# Patient Record
Sex: MALE | Race: BLACK OR AFRICAN AMERICAN | Employment: FULL TIME | URBAN - METROPOLITAN AREA
[De-identification: names, ages, dates, MRNs, and addresses within clinical notes are randomized per-mention and may not be internally consistent; named-entity substitution may affect disease eponyms.]

---

## 2017-01-06 ENCOUNTER — OFFICE VISIT (OUTPATIENT)
Dept: INTERNAL MEDICINE CLINIC | Age: 37
End: 2017-01-06

## 2017-01-06 VITALS
TEMPERATURE: 99.1 F | OXYGEN SATURATION: 98 % | SYSTOLIC BLOOD PRESSURE: 120 MMHG | RESPIRATION RATE: 14 BRPM | BODY MASS INDEX: 30.94 KG/M2 | DIASTOLIC BLOOD PRESSURE: 80 MMHG | HEART RATE: 81 BPM | HEIGHT: 71 IN | WEIGHT: 221 LBS

## 2017-01-06 DIAGNOSIS — F17.200 TOBACCO DEPENDENCE: ICD-10-CM

## 2017-01-06 DIAGNOSIS — J45.20 MILD INTERMITTENT ASTHMA IN ADULT WITHOUT COMPLICATION: Primary | ICD-10-CM

## 2017-01-06 PROBLEM — Z82.49 FAMILY HISTORY OF CARDIOVASCULAR DISEASE: Status: ACTIVE | Noted: 2017-01-06

## 2017-01-06 NOTE — PROGRESS NOTES
Reviewed record in preparation for visit and have obtained necessary documentation. Identified pt with two pt identifiers(name and ). Health Maintenance Due   Topic    Pneumococcal 19-64 Medium Risk (1 of 1 - PPSV23)    DTaP/Tdap/Td series (1 - Tdap)         No chief complaint on file. Wt Readings from Last 3 Encounters:   17 221 lb (100.2 kg)   10/14/15 212 lb (96.2 kg)   07/14/15 218 lb (98.9 kg)     Temp Readings from Last 3 Encounters:   17 99.1 °F (37.3 °C) (Oral)   10/14/15 98.8 °F (37.1 °C) (Oral)   07/14/15 98.2 °F (36.8 °C) (Oral)     BP Readings from Last 3 Encounters:   17 120/80   10/14/15 120/80   07/14/15 124/60     Pulse Readings from Last 3 Encounters:   17 81   10/14/15 76   07/14/15 62           Learning Assessment:  :     Learning Assessment 2014   PRIMARY LEARNER Patient   HIGHEST LEVEL OF EDUCATION - PRIMARY LEARNER  GRADUATED HIGH SCHOOL OR GED   BARRIERS PRIMARY LEARNER NONE   CO-LEARNER CAREGIVER No   PRIMARY LANGUAGE ENGLISH    NEED No   LEARNER PREFERENCE PRIMARY READING     DEMONSTRATION   LEARNING SPECIAL TOPICS no   ANSWERED BY patient   RELATIONSHIP SELF       Depression Screening:  :     PHQ 2 / 9, over the last two weeks 2017   Little interest or pleasure in doing things Not at all   Feeling down, depressed or hopeless Not at all   Total Score PHQ 2 0       Fall Risk Assessment:  :     No flowsheet data found. Abuse Screening:  :     Abuse Screening Questionnaire 2014   Do you ever feel afraid of your partner? N   Are you in a relationship with someone who physically or mentally threatens you? N   Is it safe for you to go home?  Y       Coordination of Care Questionnaire:  :     1) Have you been to an emergency room, urgent care clinic since your last visit? no   Hospitalized since your last visit? no             2) Have you seen or consulted any other health care providers outside of [address] [place] [address] since your last visit? no  (Include any pap smears or colon screenings in this section.)    3) Do you have an Advance Directive on file? no    4) Are you interested in receiving information on Advance Directives?  NO

## 2017-01-06 NOTE — PATIENT INSTRUCTIONS
Asthma Action Plan: After Your Visit  Your Care Instructions  An asthma action plan is based on peak flow and asthma symptoms. Sorting symptoms and peak flow into red, yellow, and green \"zones\" can help you know how bad your asthma is and what actions you should take. Work with your doctor to make your plan. An action plan may include:  · The peak flow readings and symptoms for each zone. · What medicines to take in each zone. · When to call a doctor. · A list of emergency contact numbers. · A list of your asthma triggers. Follow-up care is a key part of your treatment and safety. Be sure to make and go to all appointments, and call your doctor if you are having problems. It's also a good idea to know your test results and keep a list of the medicines you take. How can you care for yourself at home? · Take your daily medicines to help minimize long-term damage and avoid asthma attacks. · Check your peak flow every morning and evening. This is the best way to know how well your lungs are working. · Check your action plan to see what zone you are in.  ¨ If you are in the green zone, keep taking your daily asthma medicines as prescribed. ¨ If you are in the yellow zone, you may be having or will soon have an asthma attack. You may not have any symptoms, but your lungs are not working as well as they should. Take the medicines listed in your action plan. If you stay in the yellow zone, your doctor may need to increase the dose or add a medicine. ¨ If you are in the red zone, follow your action plan. If your symptoms or peak flow don't improve soon, you may need to go to the emergency room or be admitted to the hospital.  · Use an asthma diary. Write down your peak flow readings in the asthma diary. If you have an attack, write down what caused it (if you know), the symptoms, and what medicine you took.   · Make sure you know how and when to call your doctor or go to the hospital.  · Take both the asthma action plan and the asthma diaryalong with your peak flow meter and medicineswhen you see your doctor. Tell your doctor if you are having trouble following your action plan. When should you call for help? Call 911 anytime you think you may need emergency care. For example, call if:  · You have severe trouble breathing. Call your doctor now or seek immediate medical care if:  · Your symptoms do not get better after you have followed your asthma action plan. · You cough up yellow, dark brown, or bloody mucus (sputum). Watch closely for changes in your health, and be sure to contact your doctor if:  · Your coughing and wheezing get worse. · You need to use quick-relief medicine on more than 2 days a week (unless it is just for exercise). · You need help figuring out what is triggering your asthma attacks. Where can you learn more? Go to [employer].be  Enter B511 in the search box to learn more about \"Asthma Action Plan: After Your Visit. \"   © 3016-7691 Healthwise, Incorporated. Care instructions adapted under license by Woody Cameron (which disclaims liability or warranty for this information). This care instruction is for use with your licensed healthcare professional. If you have questions about a medical condition or this instruction, always ask your healthcare professional. [place] [address] any warranty or liability for your use of this information. Content Version: 28.0.577202;  Last Revised: March 9, 2012

## 2017-01-06 NOTE — PROGRESS NOTES
HPI:  Zachary Rodriguez is a 39y.o. year old male who returns to clinic today for routine follow up appointment to discuss the issues below:    Here for follow up of asthma and tobacco dependence and med refill. He uses Dulera only prn with albuterol as a rescue inhaler typically for season / weather changes only. He feels generally well. Attends physical therapy for his achilles which he injured in July. Was running prior to this and felt well. Denies coughing or chest tightness. Wheezing is rare. Tried Chantix in Dec at wife's urging, took for 2 mo and tried to wean cigarette use down, was unsuccessful. Reports he doesn't have the motivation to quit currently and is back to 1/2 ppd. Prior to Admission medications    Medication Sig Start Date End Date Taking? Authorizing Provider   albuterol (PROAIR HFA) 90 mcg/actuation inhaler Take 1 Puff by inhalation every four (4) hours as needed for Wheezing or Shortness of Breath. 10/18/16  Yes Saima Vizcaino MD   mometasone-formoterol [facility]) 100-5 mcg/actuation HFA inhaler Take 1 Puff by inhalation two (2) times a day. 10/18/16  Yes Saima Vizcaino MD   fluticasone [facility]) 50 mcg/actuation nasal spray 2 Sprays by Both Nostrils route daily. 7/14/15  Yes Sonia Botello MD   Cetirizine (ZYRTEC) 10 mg cap Take  by mouth. Yes Historical Provider   omeprazole (PRILOSEC) 20 mg capsule Take 1 Cap by mouth two (2) times a day. 10/14/15   Sonia Botello MD          No Known Allergies        Review of Systems   Constitutional: Negative for chills, fever and malaise/fatigue. HENT: Negative for congestion. Respiratory: Negative for cough, shortness of breath and wheezing. Cardiovascular: Negative for chest pain, palpitations and leg swelling. Gastrointestinal: Negative for abdominal pain, blood in stool and heartburn. Musculoskeletal: Negative for falls, joint pain and myalgias. Neurological: Negative for dizziness and headaches.          Physical Exam   Constitutional: He appears well-nourished. Neck: Carotid bruit is not present. Cardiovascular: Normal rate, regular rhythm and normal heart sounds. No murmur heard. Pulses:       Carotid pulses are 2+ on the right side, and 2+ on the left side. Pulmonary/Chest: Effort normal and breath sounds normal.   Abdominal: Soft. Bowel sounds are normal. There is no hepatosplenomegaly. There is no tenderness. Musculoskeletal: He exhibits no edema. Visit Vitals    /80 (BP 1 Location: Left arm, BP Patient Position: Sitting)    Pulse 81    Temp 99.1 °F (37.3 °C) (Oral)    Resp 14    Ht 5' 10.5\" (1.791 m)    Wt 221 lb (100.2 kg)    SpO2 98%    BMI 31.26 kg/m2         Assessment & Plan:  Diagnoses and all orders for this visit:    Mild intermittent asthma in adult without complication  Tobacco dependence  Continue prn use of inhaled corticosteroid as he is overall doing quite well. Counseled about smoking cessation and increased risk for cardiovascular disease/stroke. he opted not to get the flu vaccine today   Also declines Tdap and Pneumovax. Follow-up Disposition:  Return in about 1 year (around 1/6/2018) for Annual Physicla or sooner as needed. Advised him to call back or return to office if symptoms worsen/change/persist.  Discussed expected course/resolution/complications of diagnosis in detail with patient. Medication risks/benefits/costs/interactions/alternatives discussed with patient. He was given an after visit summary which includes diagnoses, current medications, & vitals. He expressed understanding with the diagnosis and plan.

## 2017-01-06 NOTE — MR AVS SNAPSHOT
Visit Information Date & Time Provider Department Dept. Phone Encounter #  
 1/6/2017  8:40 AM Gavin Barragan MD [place] [id number] [facility] [phone] [id number] Follow-up Instructions Return in about 1 year (around 1/6/2018) for Annual Physicla or sooner as needed. Upcoming Health Maintenance Date Due DTaP/Tdap/Td series (2 - Td) 1/6/2027 Allergies as of 1/6/2017  Review Complete On: 1/6/2017 By: Gavin Barragan MD  
 No Known Allergies Current Immunizations  Reviewed on 8/5/2014 No immunizations on file. Not reviewed this visit You Were Diagnosed With   
  
 Codes Comments Mild intermittent asthma in adult without complication    -  Primary ICD-10-CM: J45.20 ICD-9-CM: 493.90 Tobacco dependence     ICD-10-CM: P01.058 ICD-9-CM: 305.1 Vitals BP Pulse Temp Resp Height(growth percentile) Weight(growth percentile) 120/80 (BP 1 Location: Left arm, BP Patient Position: Sitting) 81 99.1 °F (37.3 °C) (Oral) 14 5' 10.5\" (1.791 m) 221 lb (100.2 kg) SpO2 BMI Smoking Status 98% 31.26 kg/m2 Current Every Day Smoker Vitals History BMI and BSA Data Body Mass Index Body Surface Area  
 31.26 kg/m 2 2.23 m 2 Preferred Pharmacy Pharmacy Name Phone [facility] [place], [address] [place] AT [facility] [phone] Your Updated Medication List  
  
   
This list is accurate as of: 1/6/17  9:20 AM.  Always use your most recent med list.  
  
  
  
  
 albuterol 90 mcg/actuation inhaler Commonly known as:  PROAIR HFA Take 1 Puff by inhalation every four (4) hours as needed for Wheezing or Shortness of Breath. fluticasone 50 mcg/actuation nasal spray Commonly known as:  Ama Fryer 2 Sprays by Both Nostrils route daily. mometasone-formoterol 100-5 mcg/actuation HFA inhaler Commonly known as:  Amarilis Demi Take 1 Puff by inhalation two (2) times a day. ZyrTEC 10 mg Cap Generic drug:  Cetirizine Take  by mouth. Follow-up Instructions Return in about 1 year (around 1/6/2018) for Annual Physicla or sooner as needed. Patient Instructions Asthma Action Plan: After Your Visit Your Care Instructions An asthma action plan is based on peak flow and asthma symptoms. Sorting symptoms and peak flow into red, yellow, and green \"zones\" can help you know how bad your asthma is and what actions you should take. Work with your doctor to make your plan. An action plan may include: · The peak flow readings and symptoms for each zone. · What medicines to take in each zone. · When to call a doctor. · A list of emergency contact numbers. · A list of your asthma triggers. Follow-up care is a key part of your treatment and safety. Be sure to make and go to all appointments, and call your doctor if you are having problems. It's also a good idea to know your test results and keep a list of the medicines you take. How can you care for yourself at home? · Take your daily medicines to help minimize long-term damage and avoid asthma attacks. · Check your peak flow every morning and evening. This is the best way to know how well your lungs are working. · Check your action plan to see what zone you are in. 
¨ If you are in the green zone, keep taking your daily asthma medicines as prescribed. ¨ If you are in the yellow zone, you may be having or will soon have an asthma attack. You may not have any symptoms, but your lungs are not working as well as they should. Take the medicines listed in your action plan. If you stay in the yellow zone, your doctor may need to increase the dose or add a medicine. ¨ If you are in the red zone, follow your action plan.  If your symptoms or peak flow don't improve soon, you may need to go to the emergency room or be admitted to the hospital. 
 · Use an asthma diary. Write down your peak flow readings in the asthma diary. If you have an attack, write down what caused it (if you know), the symptoms, and what medicine you took. · Make sure you know how and when to call your doctor or go to the hospital. 
· Take both the asthma action plan and the asthma diaryalong with your peak flow meter and medicineswhen you see your doctor. Tell your doctor if you are having trouble following your action plan. When should you call for help? Call 911 anytime you think you may need emergency care. For example, call if: 
· You have severe trouble breathing. Call your doctor now or seek immediate medical care if: 
· Your symptoms do not get better after you have followed your asthma action plan. · You cough up yellow, dark brown, or bloody mucus (sputum). Watch closely for changes in your health, and be sure to contact your doctor if: 
· Your coughing and wheezing get worse. · You need to use quick-relief medicine on more than 2 days a week (unless it is just for exercise). · You need help figuring out what is triggering your asthma attacks. Where can you learn more? Go to [employer].be Enter 549 [id number] in the search box to learn more about \"Asthma Action Plan: After Your Visit. \"  
© 8694-0199 Healthwise, Incorporated. Care instructions adapted under license by Benitez Felton (which disclaims liability or warranty for this information). This care instruction is for use with your licensed healthcare professional. If you have questions about a medical condition or this instruction, always ask your healthcare professional. [place] [address] any warranty or liability for your use of this information. Content Version: 00.4.054579; Last Revised: March 9, 2012 Introducing [facility]!    
 Benite[employer] Felton introduces [employer] patient portal. Now you can access parts of your medical record, email your doctor's office, and request medication refills online. 1. In your internet browser, go to [email or web address] 2. Click on the First Time User? Click Here link in the Sign In box. You will see the New Member Sign Up page. 3. Enter your [facility] Access Code exactly as it appears below. You will not need to use this code after youve completed the sign-up process. If you do not sign up before the expiration date, you must request a new code. · [facility] Access Code: [email or web address] Expires: 4/6/2017  9:18 AM 
 
4. Enter the last four digits of your Social Security Number (xxxx) and Date of Birth (mm/dd/yyyy) as indicated and click Submit. You will be taken to the next sign-up page. 5. Create a [facility] ID. This will be your [facility] login ID and cannot be changed, so think of one that is secure and easy to remember. 6. Create a [facility] password. You can change your password at any time. 7. Enter your Password Reset Question and Answer. This can be used at a later time if you forget your password. 8. Enter your e-mail address. You will receive e-mail notification when new information is available in [address] 9. Click Sign Up. You can now view and download portions of your medical record. 10. Click the Download Summary menu link to download a portable copy of your medical information. If you have questions, please visit the Frequently Asked Questions section of the [facility] website. Remember, [facility] is NOT to be used for urgent needs. For medical emergencies, dial 911. Now available from your iPhone and Android! Please provide this summary of care documentation to your next provider. Your primary care clinician is listed as [address]. If you have any questions after today's visit, please call [phone].

## 2017-09-29 ENCOUNTER — OFFICE VISIT (OUTPATIENT)
Dept: INTERNAL MEDICINE CLINIC | Age: 37
End: 2017-09-29

## 2017-09-29 VITALS
RESPIRATION RATE: 16 BRPM | HEIGHT: 70 IN | BODY MASS INDEX: 30.64 KG/M2 | SYSTOLIC BLOOD PRESSURE: 130 MMHG | TEMPERATURE: 98.3 F | DIASTOLIC BLOOD PRESSURE: 96 MMHG | OXYGEN SATURATION: 98 % | HEART RATE: 83 BPM | WEIGHT: 214 LBS

## 2017-09-29 DIAGNOSIS — J30.89 PERENNIAL ALLERGIC RHINITIS: ICD-10-CM

## 2017-09-29 DIAGNOSIS — F17.200 TOBACCO DEPENDENCE: ICD-10-CM

## 2017-09-29 DIAGNOSIS — Z86.39 H/O THYROID DISEASE: ICD-10-CM

## 2017-09-29 DIAGNOSIS — J45.30 MILD PERSISTENT ASTHMA WITHOUT COMPLICATION: ICD-10-CM

## 2017-09-29 DIAGNOSIS — Z82.49 FAMILY HISTORY OF CARDIOVASCULAR DISEASE: Primary | ICD-10-CM

## 2017-09-29 RX ORDER — ALBUTEROL SULFATE 90 UG/1
1 AEROSOL, METERED RESPIRATORY (INHALATION)
Qty: 1 INHALER | Refills: 3 | Status: SHIPPED | OUTPATIENT
Start: 2017-09-29 | End: 2019-04-10 | Stop reason: SDUPTHER

## 2017-09-29 RX ORDER — FLUTICASONE PROPIONATE 50 MCG
2 SPRAY, SUSPENSION (ML) NASAL DAILY
Qty: 1 BOTTLE | Refills: 3 | Status: SHIPPED | OUTPATIENT
Start: 2017-09-29 | End: 2020-04-13

## 2017-09-29 NOTE — PROGRESS NOTES
Subjective:      Ying Herrera is a 44 y.o. male who presents today to establish care      Asthma/Environmental Allergies:  [facility]- only taking prn? Not taking daily, was unaware  Albuterol inhaler prn  Asthma usually worse depending on seasons (worse in the fall) or after exercise  Takes zyrtec daily, used to do flonase    Tobacco Dependence:  1 pack/week  About his average  Tried chantix in the past- didn't work, \"didnt like how it made me feel, couldn't sleep for nothing\" x 3-4 days and then stopped    H/o Thyroid Disease:  ? Graves Disease? 10 years ago? Was given medication- took for 1 day and then never again  Juliaetta a flare up every now and then\", throat swells and hurts, gets fatigued easily  Lasts about a week and then resolves with rest    No recent unintentional wieght loss or gain, chest pain, palpitations, diarrhea/constipation/abdominal pain, blood in stool, difficulty urinating, HAs, dizziness    BP elevated above baseline today. Pt reporting that he has drank a lot of coffee today. Per chart review, always runs 120/80    Exercise: boxes, rides bike, works out frequently    Patient Active Problem List    Diagnosis Date Noted    Family history of cardiovascular disease 01/06/2017    Perennial allergic rhinitis 08/05/2014    H/O thyroid disease 08/05/2014    Tobacco dependence 08/05/2014     Current Outpatient Prescriptions   Medication Sig Dispense Refill    albuterol (PROAIR HFA) 90 mcg/actuation inhaler Take 1 Puff by inhalation every four (4) hours as needed for Wheezing or Shortness of Breath. 1 Inhaler 3    mometasone-formoterol (DULERA) 100-5 mcg/actuation HFA inhaler Take 1 Puff by inhalation two (2) times a day. 1 Inhaler 3    Cetirizine (ZYRTEC) 10 mg cap Take  by mouth.  fluticasone (FLONASE) 50 mcg/actuation nasal spray 2 Sprays by Both Nostrils route daily. 1 Bottle 3        Review of Systems    Pertinent items are noted in HPI.      Objective:     Visit Vitals    BP (!) 130/96 (BP 1 Location: Left arm, BP Patient Position: Sitting)    Pulse 83    Temp 98.3 °F (36.8 °C) (Oral)    Resp 16    Ht 5' 10\" (1.778 m)    Wt 214 lb (97.1 kg)    SpO2 98%    BMI 30.71 kg/m2     General appearance: alert, cooperative, no distress, appears stated age  Neck: supple, symmetrical, trachea midline, no adenopathy, thyroid: not enlarged, symmetric, no tenderness/mass/nodules, no carotid bruit and no JVD  Lungs: clear to auscultation bilaterally  Heart: regular rate and rhythm, S1, S2 normal, no murmur, click, rub or gallop  Extremities: extremities normal, atraumatic, steady gait  Skin: Skin color, texture, turgor normal. No rashes or lesions  Neurologic: Grossly normal    Assessment/Plan:     1. Mild persistent asthma without complication  - educated re correct inhaler use  - needs to use Dulera inhaler consistently every day  - albuterol prn  - keep good control on allergies  - stop smoking, pt not ready to stop    2. Perennial allergic rhinitis  - cont zyrtec  - fluticasone (FLONASE) 50 mcg/actuation nasal spray; 2 Sprays by Both Nostrils route daily. Dispense: 1 Bottle; Refill: 3    3. Tobacco dependence  - pt not ready to quit  - LIPID PANEL    4. Family history of cardiovascular disease  - tobacco cessation- pt not ready to quite  - will need to closely monitor BP, if continues to be borderline, may consider starting medication  - lipi panel  - CBC WITH AUTOMATED DIFF  - METABOLIC PANEL, COMPREHENSIVE  - LIPID PANEL    5. H/O thyroid disease  - no current meds, labs normal on previous checks that I can see  - TSH RFX ON ABNORMAL TO FREE T4      Advised him to call back or return to office if symptoms worsen/change/persist.  Discussed expected course/resolution/complications of diagnosis in detail with patient. Medication risks/benefits/costs/interactions/alternatives discussed with patient.   He was given an after visit summary which includes diagnoses, current medications, & vitals. He expressed understanding with the diagnosis and plan.     MICHAELA Wallace

## 2017-09-29 NOTE — MR AVS SNAPSHOT
Visit Information Date & Time Provider Department Dept. Phone Encounter #  
 9/29/2017  4:00 PM [facility] [phone] [id number] Upcoming Health Maintenance Date Due INFLUENZA AGE 9 TO ADULT 8/1/2017 DTaP/Tdap/Td series (2 - Td) 1/6/2027 Allergies as of 9/29/2017  Review Complete On: 9/29/2017 By: Rudy Rowe LPN No Known Allergies Current Immunizations  Reviewed on 8/5/2014 No immunizations on file. Not reviewed this visit You Were Diagnosed With   
  
 Codes Comments Family history of cardiovascular disease    -  Primary ICD-10-CM: Z82.49 
ICD-9-CM: V17.49 Perennial allergic rhinitis     ICD-10-CM: J30.89 ICD-9-CM: 477.8 Tobacco dependence     ICD-10-CM: F14.575 ICD-9-CM: 305.1 H/O thyroid disease     ICD-10-CM: Z86.39 
ICD-9-CM: V12.29 Vitals BP Pulse Temp Resp Height(growth percentile) Weight(growth percentile) (!) 130/96 (BP 1 Location: Left arm, BP Patient Position: Sitting) 83 98.3 °F (36.8 °C) (Oral) 16 5' 10\" (1.778 m) 214 lb (97.1 kg) SpO2 BMI Smoking Status 98% 30.71 kg/m2 Current Every Day Smoker Vitals History BMI and BSA Data Body Mass Index Body Surface Area 30.71 kg/m 2 2.19 m 2 Preferred Pharmacy Pharmacy Name Phone [facility] [place], [address] [place] AT [facility] [phone] Your Updated Medication List  
  
   
This list is accurate as of: 9/29/17  4:15 PM.  Always use your most recent med list.  
  
  
  
  
 albuterol 90 mcg/actuation inhaler Commonly known as:  PROAIR HFA Take 1 Puff by inhalation every four (4) hours as needed for Wheezing or Shortness of Breath. fluticasone 50 mcg/actuation nasal spray Commonly known as:  Juju Deborah 2 Sprays by Both Nostrils route daily. mometasone-formoterol 100-5 mcg/actuation HFA inhaler Commonly known as:  Christopher  Take 1 Puff by inhalation two (2) times a day. ZyrTEC 10 mg Cap Generic drug:  Cetirizine Take  by mouth. Prescriptions Sent to Pharmacy Refills  
 albuterol (PROAIR HFA) 90 mcg/actuation inhaler 3 Sig: Take 1 Puff by inhalation every four (4) hours as needed for Wheezing or Shortness of Breath. Class: Normal  
 Pharmacy: [address] Ph #: [phone] Route: Inhalation  
 mometasone-formoterol (DULERA) 100-5 mcg/actuation HFA inhaler 3 Sig: Take 1 Puff by inhalation two (2) times a day. Class: Normal  
 Pharmacy: [address] Ph #: [phone] Route: Inhalation  
 fluticasone (FLONASE) 50 mcg/actuation nasal spray 3 Si Sprays by Both Nostrils route daily. Class: Normal  
 Pharmacy: [address] Ph #: [phone] Route: Both Nostrils We Performed the Following CBC WITH AUTOMATED DIFF [58103 CPT(R)] LIPID PANEL [76070 CPT(R)] METABOLIC PANEL, COMPREHENSIVE [48486 CPT(R)] TSH RFX ON ABNORMAL TO FREE T4 [[id number] Custom] Patient Instructions Lab Joellen: Close to [address], [place] · [phone] Introducing [facility]! Re Clifford introduces [employer] patient portal. Now you can access parts of your medical record, email your doctor's office, and request medication refills online. 1. In your internet browser, go to [email or web address] 2. Click on the First Time User? Click Here link in the Sign In box. You will see the New Member Sign Up page. 3. Enter your [employer] Access Code exactly as it appears below. You will not need to use this code after youve completed the sign-up process.  If you do not sign up before the expiration date, you must request a new code. · [employer] Access Code: [email or web address] Expires: 12/28/2017  4:15 PM 
 
4. Enter the last four digits of your Social Security Number (xxxx) and Date of Birth (mm/dd/yyyy) as indicated and click Submit. You will be taken to the next sign-up page. 5. Create a [employer] ID. This will be your [employer] login ID and cannot be changed, so think of one that is secure and easy to remember. 6. Create a [employer] password. You can change your password at any time. 7. Enter your Password Reset Question and Answer. This can be used at a later time if you forget your password. 8. Enter your e-mail address. You will receive e-mail notification when new information is available in [address] 9. Click Sign Up. You can now view and download portions of your medical record. 10. Click the Download Summary menu link to download a portable copy of your medical information. If you have questions, please visit the Frequently Asked Questions section of the [employer] website. Remember, [employer] is NOT to be used for urgent needs. For medical emergencies, dial 911. Now available from your iPhone and Android! Please provide this summary of care documentation to your next provider. Your primary care clinician is listed as Mario Giron. If you have any questions after today's visit, please call [phone].

## 2017-09-29 NOTE — PROGRESS NOTES
Reviewed record in preparation for visit and have obtained necessary documentation. Identified pt with two pt identifiers(name and ). Health Maintenance Due   Topic    INFLUENZA AGE 9 TO ADULT          Chief Complaint   Patient presents with   [facility]     previous MPL pt. Wt Readings from Last 3 Encounters:   17 214 lb (97.1 kg)   17 221 lb (100.2 kg)   10/14/15 212 lb (96.2 kg)     Temp Readings from Last 3 Encounters:   17 98.3 °F (36.8 °C) (Oral)   17 99.1 °F (37.3 °C) (Oral)   10/14/15 98.8 °F (37.1 °C) (Oral)     BP Readings from Last 3 Encounters:   17 (!) 130/96   17 120/80   10/14/15 120/80     Pulse Readings from Last 3 Encounters:   17 83   17 81   10/14/15 76           Learning Assessment:  :     Learning Assessment 2017   PRIMARY LEARNER Patient Patient   HIGHEST LEVEL OF EDUCATION - PRIMARY LEARNER  GRADUATED HIGH SCHOOL OR GED GRADUATED HIGH SCHOOL OR GED   BARRIERS PRIMARY LEARNER NONE NONE   CO-LEARNER CAREGIVER No No   PRIMARY LANGUAGE ENGLISH ENGLISH    NEED - No   LEARNER PREFERENCE PRIMARY DEMONSTRATION READING     READING DEMONSTRATION   LEARNING SPECIAL TOPICS - no   ANSWERED BY self patient   RELATIONSHIP SELF SELF       Depression Screening:  :     PHQ over the last two weeks 2017   Little interest or pleasure in doing things Not at all   Feeling down, depressed or hopeless Not at all   Total Score PHQ 2 0       Fall Risk Assessment:  :     No flowsheet data found. Abuse Screening:  :     Abuse Screening Questionnaire 2017   Do you ever feel afraid of your partner? N N   Are you in a relationship with someone who physically or mentally threatens you? N N   Is it safe for you to go home?  Y Y       Coordination of Care Questionnaire:  :     1) Have you been to an emergency room, urgent care clinic since your last visit? no   Hospitalized since your last visit? no 2) Have you seen or consulted any other health care providers outside of [address] [place] [address] since your last visit? no  (Include any pap smears or colon screenings in this section.)    3) Do you have an Advance Directive on file? no    4) Are you interested in receiving information on Advance Directives?  NO

## 2017-10-14 LAB
ALBUMIN SERPL-MCNC: 4.9 G/DL (ref 3.5–5.5)
ALBUMIN/GLOB SERPL: 1.6 {RATIO} (ref 1.2–2.2)
ALP SERPL-CCNC: 136 IU/L (ref 39–117)
ALT SERPL-CCNC: 68 IU/L (ref 0–44)
AST SERPL-CCNC: 48 IU/L (ref 0–40)
BASOPHILS # BLD AUTO: 0 X10E3/UL (ref 0–0.2)
BASOPHILS NFR BLD AUTO: 0 %
BILIRUB SERPL-MCNC: 0.5 MG/DL (ref 0–1.2)
BUN SERPL-MCNC: 11 MG/DL (ref 6–20)
BUN/CREAT SERPL: 11 (ref 9–20)
CALCIUM SERPL-MCNC: 10.1 MG/DL (ref 8.7–10.2)
CHLORIDE SERPL-SCNC: 102 MMOL/L (ref 96–106)
CHOLEST SERPL-MCNC: 170 MG/DL (ref 100–199)
CO2 SERPL-SCNC: 26 MMOL/L (ref 18–29)
CREAT SERPL-MCNC: 0.97 MG/DL (ref 0.76–1.27)
EOSINOPHIL # BLD AUTO: 0.2 X10E3/UL (ref 0–0.4)
EOSINOPHIL NFR BLD AUTO: 4 %
ERYTHROCYTE [DISTWIDTH] IN BLOOD BY AUTOMATED COUNT: 14.2 % (ref 12.3–15.4)
GLOBULIN SER CALC-MCNC: 3 G/DL (ref 1.5–4.5)
GLUCOSE SERPL-MCNC: 98 MG/DL (ref 65–99)
HCT VFR BLD AUTO: 44 % (ref 37.5–51)
HDLC SERPL-MCNC: 64 MG/DL
HGB BLD-MCNC: 14.7 G/DL (ref 12.6–17.7)
IMM GRANULOCYTES # BLD: 0 X10E3/UL (ref 0–0.1)
IMM GRANULOCYTES NFR BLD: 0 %
INTERPRETATION, 910389: NORMAL
LDLC SERPL CALC-MCNC: 93 MG/DL (ref 0–99)
LYMPHOCYTES # BLD AUTO: 1.5 X10E3/UL (ref 0.7–3.1)
LYMPHOCYTES NFR BLD AUTO: 41 %
MCH RBC QN AUTO: 28.4 PG (ref 26.6–33)
MCHC RBC AUTO-ENTMCNC: 33.4 G/DL (ref 31.5–35.7)
MCV RBC AUTO: 85 FL (ref 79–97)
MONOCYTES # BLD AUTO: 0.4 X10E3/UL (ref 0.1–0.9)
MONOCYTES NFR BLD AUTO: 10 %
NEUTROPHILS # BLD AUTO: 1.7 X10E3/UL (ref 1.4–7)
NEUTROPHILS NFR BLD AUTO: 45 %
PLATELET # BLD AUTO: 182 X10E3/UL (ref 150–379)
POTASSIUM SERPL-SCNC: 4.7 MMOL/L (ref 3.5–5.2)
PROT SERPL-MCNC: 7.9 G/DL (ref 6–8.5)
RBC # BLD AUTO: 5.18 X10E6/UL (ref 4.14–5.8)
SODIUM SERPL-SCNC: 142 MMOL/L (ref 134–144)
T4 FREE SERPL-MCNC: 1.27 NG/DL (ref 0.82–1.77)
TRIGL SERPL-MCNC: 63 MG/DL (ref 0–149)
TSH SERPL DL<=0.005 MIU/L-ACNC: 0.01 UIU/ML (ref 0.45–4.5)
VLDLC SERPL CALC-MCNC: 13 MG/DL (ref 5–40)
WBC # BLD AUTO: 3.7 X10E3/UL (ref 3.4–10.8)

## 2017-10-16 ENCOUNTER — TELEPHONE (OUTPATIENT)
Dept: INTERNAL MEDICINE CLINIC | Age: 37
End: 2017-10-16

## 2017-10-16 DIAGNOSIS — E05.90 HYPERTHYROIDISM: Primary | ICD-10-CM

## 2017-10-16 DIAGNOSIS — R79.89 ELEVATED LFTS: ICD-10-CM

## 2017-10-16 NOTE — PROGRESS NOTES
Spoke with patient regarding abnormal thyroid function. Does have h.o untreated Graves disease in the past per patient report. Is open now to having treatment. Pt also with abnormal LFTs. Denies poor diet, etoh abuse, IV drug use. Will recheck labs for this. If continue to be elevated, will do ultrasound to evaluate further    Will send order for T3 and LFTs to the patient and patient will have done through [employer].    Gave patient name and phone number for Dr. Preet Funes, Endorcinology, referral placed in system. Patient instructed to call and schedule an appointment, if Dr. Rylan Shukla not able to see- okay to see any provider in the practice. Patient verbalized understanding of results and plan.     Renetta Naylor NP

## 2017-10-27 ENCOUNTER — PATIENT MESSAGE (OUTPATIENT)
Dept: INTERNAL MEDICINE CLINIC | Age: 37
End: 2017-10-27

## 2017-10-27 NOTE — TELEPHONE ENCOUNTER
From: Mirella Coffman Sent: 10/27/2017 10:21 AM EDT  Subject: Visit Follow-Up Question    Dr. Curiel Even can't see me until Dec 19. Is there another doctor you can refer that can get me in sooner? Thank you!

## 2017-11-02 NOTE — TELEPHONE ENCOUNTER
Patient email: I received a letter from [facility] that we need to switch the prescription for the [facility] to a [address]. Can you please call in a new prescription to the location at [facility] 96 office visit- 9/29/17  Next office visit- No Upcoming   Last Refill- 9/291/17    Requested Prescriptions     Pending Prescriptions Disp Refills    mometasone-formoterol (DULERA) 100-5 mcg/actuation HFA inhaler 1 Inhaler 3     Sig: Take 1 Puff by inhalation two (2) times a day.

## 2017-11-02 NOTE — TELEPHONE ENCOUNTER
Re-sending request to [facility] pharmacy     Last office visit- 9/29/17  Next office visit- No upcoming   Last Refill- 9/29/17      Requested Prescriptions     Pending Prescriptions Disp Refills    mometasone-formoterol (DULERA) 100-5 mcg/actuation HFA inhaler 1 Inhaler 3     Sig: Take 1 Puff by inhalation two (2) times a day.

## 2017-11-03 NOTE — TELEPHONE ENCOUNTER
Rejection message from [facility]:  Pharmacy is requesting 90 day supply of this medication. Last office visit- 9/29/17  Next office visit- 12/19/17  Last Refill- 11/3/17    Requested Prescriptions     Pending Prescriptions Disp Refills    mometasone-formoterol (DULERA) 100-5 mcg/actuation HFA inhaler 1 Inhaler 3     Sig: Take 1 Puff by inhalation two (2) times a day.

## 2017-11-07 NOTE — TELEPHONE ENCOUNTER
Received fax requesting 90 day supply of Dulera inhaler. This has been sent for #1 inhaler x3 refills multiple times. Call to pharmacy to clarify how many inhalers are considered 90 day supply, noted that #13? Lisseth Parikh Spoke with pharmacist- clarified this is 13 mg for each inhaler. Verbal order given to pharmacist to change rx to [facility] #3 inhalers with x1 refill. Meghan Rodas NP consulted and agrees. Rx called in successfully.

## 2017-11-10 LAB
ALBUMIN SERPL-MCNC: 4.9 G/DL (ref 3.5–5.5)
ALP SERPL-CCNC: 124 IU/L (ref 39–117)
ALT SERPL-CCNC: 37 IU/L (ref 0–44)
AST SERPL-CCNC: 39 IU/L (ref 0–40)
BILIRUB DIRECT SERPL-MCNC: 0.14 MG/DL (ref 0–0.4)
BILIRUB SERPL-MCNC: 0.4 MG/DL (ref 0–1.2)
PROT SERPL-MCNC: 8.1 G/DL (ref 6–8.5)
T3 SERPL-MCNC: 126 NG/DL (ref 71–180)

## 2017-12-19 ENCOUNTER — OFFICE VISIT (OUTPATIENT)
Dept: ENDOCRINOLOGY | Age: 37
End: 2017-12-19

## 2017-12-19 VITALS
HEIGHT: 70 IN | WEIGHT: 226 LBS | RESPIRATION RATE: 16 BRPM | DIASTOLIC BLOOD PRESSURE: 81 MMHG | SYSTOLIC BLOOD PRESSURE: 133 MMHG | BODY MASS INDEX: 32.35 KG/M2 | HEART RATE: 78 BPM

## 2017-12-19 DIAGNOSIS — Z86.39 H/O THYROID DISEASE: Primary | ICD-10-CM

## 2017-12-19 DIAGNOSIS — R53.83 LOW ENERGY: ICD-10-CM

## 2017-12-19 NOTE — PROGRESS NOTES
Lab Results   Component Value Date/Time    TSH 0.011 10/13/2017 08:55 AM    TSH 1.610 07/14/2015 03:06 PM    T4, Free 1.27 07/14/2015 03:06 PM

## 2017-12-19 NOTE — MR AVS SNAPSHOT
Visit Information Date & Time Provider Department Dept. Phone Encounter #  
 12/19/2017  9:50 AM Maylin Posada MD [facility]  Upcoming Health Maintenance Date Due DTaP/Tdap/Td series (2 - Td) 1/6/2027 Allergies as of 12/19/2017  Review Complete On: 12/19/2017 By: Megan Garcia No Known Allergies Current Immunizations  Reviewed on 8/5/2014 No immunizations on file. Not reviewed this visit You Were Diagnosed With   
  
 Codes Comments H/O thyroid disease    -  Primary ICD-10-CM: Z86.39 
ICD-9-CM: V12.29 Low energy     ICD-10-CM: R53.83 ICD-9-CM: 780.79 Vitals BP Pulse Resp Height(growth percentile) Weight(growth percentile) BMI  
 133/81 78 16 5' 10\" (1.778 m) 226 lb (102.5 kg) 32.43 kg/m2 Smoking Status Current Every Day Smoker Vitals History BMI and BSA Data Body Mass Index Body Surface Area  
 32.43 kg/m 2 2.25 m 2 Preferred Pharmacy Pharmacy Name Phone [id number] [place] [phone] Your Updated Medication List  
  
   
This list is accurate as of: 12/19/17  9:50 AM.  Always use your most recent med list.  
  
  
  
  
 albuterol 90 mcg/actuation inhaler Commonly known as:  PROAIR HFA Take 1 Puff by inhalation every four (4) hours as needed for Wheezing or Shortness of Breath. fluticasone 50 mcg/actuation nasal spray Commonly known as:  Nirav Merles 2 Sprays by Both Nostrils route daily. mometasone-formoterol 100-5 mcg/actuation HFA inhaler Commonly known as:  Mardeen Fuse Take 1 Puff by inhalation two (2) times a day. ZyrTEC 10 mg Cap Generic drug:  Cetirizine Take  by mouth. We Performed the Following [facility] AND LH [70496 CPT(R)] PROLACTIN [39120 CPT(R)] T3, FREE [id number] CPT(R)] T3, FREE [id number] CPT(R)] TESTOSTERONE, FREE & TOTAL [78772 CPT(R)] THYROID ANTIBODY PANEL [38410 CPT(R)] TSH AND FREE T4 [06287 CPT(R)] TSH AND FREE T4 [85076 CPT(R)] TSH RECEPTOR AB [60141 CPT(R)] Introducing [facility]! Dear Ian Puentes: Thank you for requesting a [email or web address] account. Our records indicate that you already have an active [email or web address] account. You can access your account anytime at [email or web address]" Did you know that you can access your hospital and ER discharge instructions at any time in [email or web address]? You can also review all of your test results from your hospital stay or ER visit. Additional Information If you have questions, please visit the Frequently Asked Questions section of the [email or web address] website at [email or web address] Remember, [email or web address] is NOT to be used for urgent needs. For medical emergencies, dial 911. Now available from your iPhone and Android! Please provide this summary of care documentation to your next provider. Your primary care clinician is listed as [facility]. If you have any questions after today's visit, please call [phone].

## 2017-12-19 NOTE — PROGRESS NOTES
Endocrinology New Patient Visit    CC:  Hyperthyroidism    Referring provider: James Gardner NP     History of present illness:  Patient is a pleasant 44 y.o. male here for new patient consultation for h/o Graves disease. This was diagnosed 8 years ago when he lived in [place]. He does not remember the endocrinologist he saw but remembers he had a thyroid scan and was prescribed medications that he did not start. TFTs have been normal without thyroid medication for the past few years, however recent TSH returned low at 0.011 with normal FT4 on labs done by his PCP. Today he reports symptoms including a 20 lb weight gain, fatigue, neck tenderness and swelling, some hand tremors, heat intolerance and excessive perspiration. He denies palpitations but does have decrease in exercise tolerance. He denies gritty or bulging eyes or diplopia, admits visual acuity is less though. Reports low libido and premature ejaculation. No recent iodine exposures. He dypshagia, supine compression or voice hoarseness. He is a current smoker, trying to cut down but is not ready to quit yet - smokes 1/4 PPD. ROS: see HPI for pertinent positives and negatives, otherwise a 10 system review is negative    Problem list:  Patient Active Problem List   Diagnosis Code    Perennial allergic rhinitis J30.89    H/O thyroid disease Z86.39    Tobacco dependence F17.200    Family history of cardiovascular disease Z82.49       Medical history:  Past Medical History:   Diagnosis Date    Asthma     since childhood    H/O lithotripsy 2013    Hyperthyroidism 2011    Dx in [place]       Past surgical history:  History reviewed. No pertinent surgical history.     Medications:    Current Outpatient Prescriptions:     mometasone-formoterol (DULERA) 100-5 mcg/actuation HFA inhaler, Take 1 Puff by inhalation two (2) times a day., Disp: 3 Inhaler, Rfl: 1    albuterol (PROAIR HFA) 90 mcg/actuation inhaler, Take 1 Puff by inhalation every four (4) hours as needed for Wheezing or Shortness of Breath., Disp: 1 Inhaler, Rfl: 3    Cetirizine (ZYRTEC) 10 mg cap, Take  by mouth., Disp: , Rfl:     fluticasone (FLONASE) 50 mcg/actuation nasal spray, 2 Sprays by Both Nostrils route daily. , Disp: 1 Bottle, Rfl: 3    Allergies:  No Known Allergies    Social History:  Social History     Social History    Marital status:      Spouse name: N/A    Number of children: N/A    Years of education: N/A     Occupational History   [address] [place] [address] [email or web address]     Social History Main Topics    Smoking status: Current Every Day Smoker     Packs/day: 0.50     Years: 17.00     Types: Cigarettes    Smokeless tobacco: Never Used    Alcohol use 3.5 oz/week     7 Glasses of wine per week      Comment: occ    Drug use: No    Sexual activity: Yes     Partners: Female      Comment:      Other Topics Concern    Exercise Yes     daily jog in morning     Social History Narrative       Family History:  Family History   Problem Relation Age of Onset    Heart Disease Father      Heart failure 45s    Hypertension Father     Breast Cancer Maternal Grandmother     Hypertension Sister        Physical Exam:  Visit Vitals    Ht 5' 10\" (1.778 m)    Wt 226 lb (102.5 kg)    BMI 32.43 kg/m2     Gen: NAD  Heent: mucous membranes moist, no lid lag, stare or exophthalmos. No scleral or conjunctival injection. Extra ocular muscles intact . Thyroid: moves well with swallowing, normal size, rubbery texture no thyroid bruit, no masses appreciated  Heme/lymph: no cervical, supraclavicular or submandibular lymphadenopathy is appreciated. Pulmonary: clear to auscultation bilaterally, no wheezes/rhonchi or rales  Cardiovascular: regular rate and rhythm, no murmurs, rubs or gallops, good distal pulses  Extremities: no clubbing, cyanosis or edema. No onocholysis   Neuro: no tremor of outstretched hands, reflexes are normal with normal relaxation phase.  Normal gait, normal concentration  Skin: normal texture, warm and dry  Psyche: normal affect with good insight into his medical conditions    Pertinent lab review:    Component      Latest Ref Rng & Units 11/9/2017 10/13/2017 10/13/2017 7/14/2015           8:27 AM  8:55 AM  8:55 AM  3:06 PM   TSH      0.450 - 4.500 uIU/mL   0.011 (L)    T4, Free      0.82 - 1.77 ng/dL    1.27   T4,Free (Direct)~      0.82 - 1.77 ng/dL  1.27     T3, total      71 - 180 ng/dL 126        Component      Latest Ref Rng & Units 7/14/2015 8/5/2014 8/5/2014           3:06 PM  3:15 PM  3:15 PM   TSH      0.450 - 4.500 uIU/mL 1.610  0.886   T4, Free      0.82 - 1.77 ng/dL  1.32    T4,Free (Direct)~      0.82 - 1.77 ng/dL      T3, total      71 - 180 ng/dL        Assessment and plan:  Patient is a pleasant 44 y.o. male here for new patient consultation for h/o Graves disease and recently low TSH value. Clinical picture is suggestive of hypothyroidism rather than hyper, so will repeat TSH with FT3/FT4 and thyroid antibodies (TPO and TRAb) today. Discussed that he may have competing antibodies. If TSH remains low, will order an uptake/scan to clarify the activity of his gland. Based on the results, will determine which, if any, thyroid medication is appropriate. Check testosterone given c/o fatigue and low libido. Also check FSH, LH, and prolactin. I spent 30 minutes with the patient today and > 50% of the time was spent counseling the patient about hyperthyroidism: its diagnosis, clinical manifestations, and risks/benefits of the various management options. Thank you for the opportunity to partake in this patients care.   Jyotsna Sands MD  [facility]  [facility]

## 2017-12-20 LAB
T3FREE SERPL-MCNC: 3.3 PG/ML (ref 2–4.4)
T4 FREE SERPL-MCNC: 1.19 NG/DL (ref 0.82–1.77)
THYROGLOB AB SERPL-ACNC: 78.5 IU/ML (ref 0–0.9)
THYROPEROXIDASE AB SERPL-ACNC: 468 IU/ML (ref 0–34)
TSH RECEP AB SER-ACNC: 6.08 IU/L (ref 0–1.75)
TSH SERPL DL<=0.005 MIU/L-ACNC: 0.54 UIU/ML (ref 0.45–4.5)

## 2017-12-21 LAB
FSH SERPL-ACNC: 6.8 MIU/ML (ref 1.5–12.4)
LH SERPL-ACNC: 6 MIU/ML (ref 1.7–8.6)
PROLACTIN SERPL-MCNC: 3.5 NG/ML (ref 4–15.2)
TESTOST FREE SERPL-MCNC: 15.3 PG/ML (ref 8.7–25.1)
TESTOST SERPL-MCNC: 657 NG/DL (ref 264–916)

## 2018-02-05 NOTE — TELEPHONE ENCOUNTER
Requested Prescriptions     Pending Prescriptions Disp Refills    mometasone-formoterol (DULERA) 100-5 mcg/actuation HFA inhaler 3 Inhaler 1     Sig: Take 1 Puff by inhalation two (2) times a day.      90 day supply  09/29/2017  No upcoming  Called in by pharmacy

## 2018-02-15 DIAGNOSIS — J45.20 MILD INTERMITTENT ASTHMA WITHOUT COMPLICATION: Primary | ICD-10-CM

## 2018-02-15 RX ORDER — BUDESONIDE AND FORMOTEROL FUMARATE DIHYDRATE 80; 4.5 UG/1; UG/1
2 AEROSOL RESPIRATORY (INHALATION) 2 TIMES DAILY
Qty: 1 INHALER | Refills: 2 | Status: SHIPPED | OUTPATIENT
Start: 2018-02-15 | End: 2018-12-19 | Stop reason: SDUPTHER

## 2018-12-19 DIAGNOSIS — J45.20 MILD INTERMITTENT ASTHMA WITHOUT COMPLICATION: ICD-10-CM

## 2018-12-19 RX ORDER — BUDESONIDE AND FORMOTEROL FUMARATE DIHYDRATE 80; 4.5 UG/1; UG/1
2 AEROSOL RESPIRATORY (INHALATION) 2 TIMES DAILY
Qty: 1 INHALER | Refills: 0 | Status: SHIPPED | OUTPATIENT
Start: 2018-12-19 | End: 2019-04-10 | Stop reason: SDUPTHER

## 2018-12-19 NOTE — TELEPHONE ENCOUNTER
Last refill- 02.15.18  Last office visit - 9/29/2017  (3 month f/u requested)  Next office visit - No future appointments. Requested Prescriptions     Pending Prescriptions Disp Refills    budesonide-formoterol (SYMBICORT) 80-4.5 mcg/actuation HFAA 1 Inhaler 2     Sig: Take 2 Puffs by inhalation two (2) times a day.

## 2018-12-19 NOTE — TELEPHONE ENCOUNTER
lov 9/29/17, no upcoming appts have been scheduled    msg sent through [facility] for pt to schedule OV soon.

## 2019-04-08 NOTE — PROGRESS NOTES
Subjective:  
  
Yanique Arguello is a 43 y.o. male who presents today for CPE Saw Dr. Gloria Greenberg in 2017 for history of graves disease Per note:  \"Clinical picture is suggestive of hypothyroidism rather than hyper, so will repeat TSH with FT3/FT4 and thyroid antibodies (TPO and TRAb) today. Discussed that he may have competing antibodies. If TSH remains low, will order an uptake/scan to clarify the activity of his gland\" 
  
Asthma/Environmental Allergies: 
Has been out of inhalers for about 2 weeks Symbicort daily Albuterol prn, using right now about 2x/day for wheezing and mild dyspnea Taking zyrtec and flonase daily Continues to smoke cigarettes, tried chantix in the past \"made me crazy\" 
  
Kidney Stones: 
S/pLithotripsy 10mm stone, in January [facility] No diifculty urinating, odor to urine Denies any chest pain, palpitations, abdominal pain, bowel changes, blood in stool, headaches, or dizziness Tdap: utd Influenza: utd Patient Active Problem List  
 Diagnosis Date Noted  Low energy 12/19/2017  Family history of cardiovascular disease 01/06/2017  Perennial allergic rhinitis 08/05/2014  
 H/O thyroid disease 08/05/2014  Tobacco dependence 08/05/2014 Current Outpatient Medications Medication Sig Dispense Refill  budesonide-formoterol (SYMBICORT) 80-4.5 mcg/actuation HFAA Take 2 Puffs by inhalation two (2) times a day. 1 Inhaler 0  
 albuterol (PROAIR HFA) 90 mcg/actuation inhaler Take 1 Puff by inhalation every four (4) hours as needed for Wheezing or Shortness of Breath. 1 Inhaler 3  
 fluticasone (FLONASE) 50 mcg/actuation nasal spray 2 Sprays by Both Nostrils route daily. 1 Bottle 3  
 Cetirizine (ZYRTEC) 10 mg cap Take  by mouth. Review of Systems Pertinent items are noted in HPI. Objective:  
 
Visit Vitals /86 (BP 1 Location: Right arm, BP Patient Position: Sitting) Pulse 71 Temp 98.5 °F (36.9 °C) (Oral) Resp 18 Ht 5' 10\" (1.778 m) Wt 217 lb 12.8 oz (98.8 kg) SpO2 98% BMI 31.25 kg/m² General:  Alert, cooperative, no distress, appears stated age, overweight. Head:  Normocephalic, without obvious abnormality, atraumatic. Eyes:  Conjunctivae/corneas clear. PERRL, EOMs intact Ears:  Normal TMs and external ear canals both ears. Nose: Nares normal. Septum midline. Mucosa normal  
Throat: Lips, mucosa, and tongue normal. Teeth and gums normal.  
Neck: Supple, symmetrical, trachea midline, no adenopathy, thyroid: no enlargement/tenderness/nodules, no carotid bruit and no JVD. Back:   Symmetric, no curvature. ROM normal  
Lungs:   Clear to auscultation bilaterally. Chest wall:  No tenderness or deformity. Heart:  Regular rate and rhythm, S1, S2 normal, no murmur, click, rub or gallop. Abdomen:   Soft, non-tender. Bowel sounds normal. No masses,  No organomegaly. Extremities: Extremities normal, atraumatic, no cyanosis or edema. Pulses: 2+ and symmetric all extremities. Skin: Skin color, texture, turgor normal. No rashes or lesions Lymph nodes: Cervical, supraclavicular nodes normal.  
Neurologic: CNII-XII intact. Normal strength, sensation throughout. Assessment/Plan: 1. Annual physical exam 
- encouraged self care, tobacco cessation, healthy diet, regular exercise - LIPID PANEL 
- TSH 3RD GENERATION 
- T4, FREE 
- CBC WITH AUTOMATED DIFF 
- METABOLIC PANEL, COMPREHENSIVE 2. Mild intermittent asthma without complication 
- restart inhalers 
- trial singulair daily - budesonide-formoterol (SYMBICORT) 80-4.5 mcg/actuation HFAA; Take 2 Puffs by inhalation two (2) times a day. Dispense: 1 Inhaler; Refill: 11 
- albuterol (PROAIR HFA) 90 mcg/actuation inhaler; Take 1 Puff by inhalation every four (4) hours as needed for Wheezing or Shortness of Breath. Dispense: 1 Inhaler; Refill: 11 
- montelukast (SINGULAIR) 10 mg tablet; Take 1 Tab by mouth daily. Dispense: 90 Tab; Refill: 3 3. Perennial allergic rhinitis 
- as above 
- cont zyrtec 
- montelukast (SINGULAIR) 10 mg tablet; Take 1 Tab by mouth daily. Dispense: 90 Tab; Refill: 3 
 
4. Tobacco dependence 
- information provided, encouraged setting quit date, etc 
 
5. H/O thyroid disease 
- TSH 3RD GENERATION 
- T4, FREE Advised him to call back or return to office if symptoms worsen/change/persist. 
Discussed expected course/resolution/complications of diagnosis in detail with patient. Medication risks/benefits/costs/interactions/alternatives discussed with patient. He was given an after visit summary which includes diagnoses, current medications, & vitals. He expressed understanding with the diagnosis and plan.  
 
MICHAELA Cristobal

## 2019-04-10 ENCOUNTER — OFFICE VISIT (OUTPATIENT)
Dept: INTERNAL MEDICINE CLINIC | Age: 39
End: 2019-04-10

## 2019-04-10 VITALS
HEIGHT: 70 IN | OXYGEN SATURATION: 98 % | TEMPERATURE: 98.5 F | DIASTOLIC BLOOD PRESSURE: 86 MMHG | SYSTOLIC BLOOD PRESSURE: 122 MMHG | BODY MASS INDEX: 31.18 KG/M2 | HEART RATE: 71 BPM | WEIGHT: 217.8 LBS | RESPIRATION RATE: 18 BRPM

## 2019-04-10 DIAGNOSIS — F17.200 TOBACCO DEPENDENCE: ICD-10-CM

## 2019-04-10 DIAGNOSIS — Z86.39 H/O THYROID DISEASE: ICD-10-CM

## 2019-04-10 DIAGNOSIS — J30.89 PERENNIAL ALLERGIC RHINITIS: ICD-10-CM

## 2019-04-10 DIAGNOSIS — Z00.00 ANNUAL PHYSICAL EXAM: Primary | ICD-10-CM

## 2019-04-10 DIAGNOSIS — J45.20 MILD INTERMITTENT ASTHMA WITHOUT COMPLICATION: ICD-10-CM

## 2019-04-10 RX ORDER — MONTELUKAST SODIUM 10 MG/1
10 TABLET ORAL DAILY
Qty: 90 TAB | Refills: 3 | Status: SHIPPED | OUTPATIENT
Start: 2019-04-10 | End: 2020-03-24 | Stop reason: SDUPTHER

## 2019-04-10 RX ORDER — ALBUTEROL SULFATE 90 UG/1
1 AEROSOL, METERED RESPIRATORY (INHALATION)
Qty: 1 INHALER | Refills: 11 | Status: SHIPPED | OUTPATIENT
Start: 2019-04-10 | End: 2020-04-13 | Stop reason: SDUPTHER

## 2019-04-10 RX ORDER — BUDESONIDE AND FORMOTEROL FUMARATE DIHYDRATE 80; 4.5 UG/1; UG/1
2 AEROSOL RESPIRATORY (INHALATION) 2 TIMES DAILY
Qty: 1 INHALER | Refills: 11 | Status: SHIPPED | OUTPATIENT
Start: 2019-04-10 | End: 2020-04-13 | Stop reason: SDUPTHER

## 2019-04-10 NOTE — PROGRESS NOTES
Reviewed record in preparation for visit and have obtained necessary documentation. Identified pt with two pt identifiers(name and ). Health Maintenance Due Topic  Pneumococcal 0-64 years (1 of 1 - PPSV23) Chief Complaint Patient presents with  Complete Physical  
  
 
Wt Readings from Last 3 Encounters:  
04/10/19 217 lb 12.8 oz (98.8 kg) 17 226 lb (102.5 kg) 17 214 lb (97.1 kg) Temp Readings from Last 3 Encounters:  
17 98.3 °F (36.8 °C) (Oral) 17 99.1 °F (37.3 °C) (Oral) 10/14/15 98.8 °F (37.1 °C) (Oral) BP Readings from Last 3 Encounters:  
17 133/81  
17 (!) 130/96  
17 120/80 Pulse Readings from Last 3 Encounters:  
17 78  
17 83  
17 81 Learning Assessment: 
:  
 
Learning Assessment 4/10/2019 2017 2017 2014 PRIMARY LEARNER Patient Patient Patient Patient HIGHEST LEVEL OF EDUCATION - PRIMARY LEARNER  SOME COLLEGE SOME COLLEGE GRADUATED HIGH SCHOOL OR GED GRADUATED HIGH SCHOOL OR GED  
BARRIERS PRIMARY LEARNER NONE NONE NONE NONE  
CO-LEARNER CAREGIVER - No No No  
PRIMARY LANGUAGE ENGLISH ENGLISH ENGLISH ENGLISH  NEED - No - No  
LEARNER PREFERENCE PRIMARY DEMONSTRATION READING DEMONSTRATION READING  
  - LISTENING READING DEMONSTRATION  
LEARNING SPECIAL TOPICS - no - no ANSWERED BY patient patient self patient RELATIONSHIP SELF SELF SELF SELF  
ASSESSMENT COMMENT - h - - Depression Screening: 
:  
 
3 most recent PHQ Screens 2017 Little interest or pleasure in doing things Not at all Feeling down, depressed, irritable, or hopeless Not at all Total Score PHQ 2 0 Fall Risk Assessment: 
:  
 
Fall Risk Assessment, last 12 mths 4/10/2019 Able to walk? Yes Fall in past 12 months? No  
 
 
Abuse Screening: 
:  
 
Abuse Screening Questionnaire 4/10/2019 2017 2014 Do you ever feel afraid of your partner?  N N N  
 Are you in a relationship with someone who physically or mentally threatens you? N N N Is it safe for you to go home? Sukhdev Jang Coordination of Care Questionnaire: 
:  
 
1) Have you been to an emergency room, urgent care clinic since your last visit? yes [address] [facility] ER 2/2018 Hospitalized since your last visit? yes  2/17/2018 [facility] Dx: Kidney Stones 2) Have you seen or consulted any other health care providers outside of [address] [place] [address] since your last visit? yes  [facility] 2/2018 Dr. Tinoco Aid  (Include any pap smears or colon screenings in this section.) 3) Do you have an Advance Directive on file? no 
 
4) Are you interested in receiving information on Advance Directives? NO Patient is accompanied by self I have received verbal consent from Gutierrez Frias Sr. to discuss any/all medical information while they are present in the room.

## 2019-04-10 NOTE — PATIENT INSTRUCTIONS
Start montelukast 10mg, 1 pill once a day at night Stopping Smoking: Care Instructions Your Care Instructions Cigarette smokers crave the nicotine in cigarettes. Giving it up is much harder than simply changing a habit. Your body has to stop craving the nicotine. It is hard to quit, but you can do it. There are many tools that people use to quit smoking. You may find that combining tools works best for you. There are several steps to quitting. First you get ready to quit. Then you get support to help you. After that, you learn new skills and behaviors to become a nonsmoker. For many people, a necessary step is getting and using medicine. Your doctor will help you set up the plan that best meets your needs. You may want to attend a smoking cessation program to help you quit smoking. When you choose a program, look for one that has proven success. Ask your doctor for ideas. You will greatly increase your chances of success if you take medicine as well as get counseling or join a cessation program. 
Some of the changes you feel when you first quit tobacco are uncomfortable. Your body will miss the nicotine at first, and you may feel short-tempered and grumpy. You may have trouble sleeping or concentrating. Medicine can help you deal with these symptoms. You may struggle with changing your smoking habits and rituals. The last step is the tricky one: Be prepared for the smoking urge to continue for a time. This is a lot to deal with, but keep at it. You will feel better. Follow-up care is a key part of your treatment and safety. Be sure to make and go to all appointments, and call your doctor if you are having problems. It's also a good idea to know your test results and keep a list of the medicines you take. How can you care for yourself at home? · Ask your family, friends, and coworkers for support. You have a better chance of quitting if you have help and support. · Join a support group, such as Nicotine Anonymous, for people who are trying to quit smoking. · Consider signing up for a smoking cessation program, such as the American Lung Association's Freedom from Smoking program. 
· Get text messaging support. Go to the website at www.smokefree. gov to sign up for the [facility] program. 
· Set a quit date. Pick your date carefully so that it is not right in the middle of a big deadline or stressful time. Once you quit, do not even take a puff. Get rid of all ashtrays and lighters after your last cigarette. Clean your house and your clothes so that they do not smell of smoke. · Learn how to be a nonsmoker. Think about ways you can avoid those things that make you reach for a cigarette. ? Avoid situations that put you at greatest risk for smoking. For some people, it is hard to have a drink with friends without smoking. For others, they might skip a coffee break with coworkers who smoke. ? Change your daily routine. Take a different route to work or eat a meal in a different place. · Cut down on stress. Calm yourself or release tension by doing an activity you enjoy, such as reading a book, taking a hot bath, or gardening. · Talk to your doctor or pharmacist about nicotine replacement therapy, which replaces the nicotine in your body. You still get nicotine but you do not use tobacco. Nicotine replacement products help you slowly reduce the amount of nicotine you need. These products come in several forms, many of them available over-the-counter: ? Nicotine patches ? Nicotine gum and lozenges ? Nicotine inhaler · Ask your doctor about bupropion (Wellbutrin) or varenicline (Chantix), which are prescription medicines. They do not contain nicotine. They help you by reducing withdrawal symptoms, such as stress and anxiety. · Some people find hypnosis, acupuncture, and massage helpful for ending the smoking habit. · Eat a healthy diet and get regular exercise. Having healthy habits will help your body move past its craving for nicotine. · Be prepared to keep trying. Most people are not successful the first few times they try to quit. Do not get mad at yourself if you smoke again. Make a list of things you learned and think about when you want to try again, such as next week, next month, or next year. Where can you learn more? Go to http://jean-sindi.info/. Enter B463 in the search box to learn more about \"Stopping Smoking: Care Instructions. \" Current as of: September 26, 2018 Content Version: 11.9 © 6562-2281 [employer]. Care instructions adapted under license by [employer] (which disclaims liability or warranty for this information). If you have questions about a medical condition or this instruction, always ask your healthcare professional. [place] [address] any warranty or liability for your use of this information.

## 2019-04-11 LAB
ALBUMIN SERPL-MCNC: 4.8 G/DL (ref 3.5–5.5)
ALBUMIN/GLOB SERPL: 1.5 {RATIO} (ref 1.2–2.2)
ALP SERPL-CCNC: 98 IU/L (ref 39–117)
ALT SERPL-CCNC: 37 IU/L (ref 0–44)
AST SERPL-CCNC: 42 IU/L (ref 0–40)
BASOPHILS # BLD AUTO: 0 X10E3/UL (ref 0–0.2)
BASOPHILS NFR BLD AUTO: 0 %
BILIRUB SERPL-MCNC: 0.4 MG/DL (ref 0–1.2)
BUN SERPL-MCNC: 18 MG/DL (ref 6–20)
BUN/CREAT SERPL: 22 (ref 9–20)
CALCIUM SERPL-MCNC: 9.6 MG/DL (ref 8.7–10.2)
CHLORIDE SERPL-SCNC: 106 MMOL/L (ref 96–106)
CHOLEST SERPL-MCNC: 177 MG/DL (ref 100–199)
CO2 SERPL-SCNC: 23 MMOL/L (ref 20–29)
CREAT SERPL-MCNC: 0.82 MG/DL (ref 0.76–1.27)
EOSINOPHIL # BLD AUTO: 0.1 X10E3/UL (ref 0–0.4)
EOSINOPHIL NFR BLD AUTO: 3 %
ERYTHROCYTE [DISTWIDTH] IN BLOOD BY AUTOMATED COUNT: 14.4 % (ref 12.3–15.4)
GLOBULIN SER CALC-MCNC: 3.3 G/DL (ref 1.5–4.5)
GLUCOSE SERPL-MCNC: 96 MG/DL (ref 65–99)
HCT VFR BLD AUTO: 45.5 % (ref 37.5–51)
HDLC SERPL-MCNC: 56 MG/DL
HGB BLD-MCNC: 15.3 G/DL (ref 13–17.7)
IMM GRANULOCYTES # BLD AUTO: 0 X10E3/UL (ref 0–0.1)
IMM GRANULOCYTES NFR BLD AUTO: 0 %
INTERPRETATION, 910389: NORMAL
LDLC SERPL CALC-MCNC: 108 MG/DL (ref 0–99)
LYMPHOCYTES # BLD AUTO: 1.3 X10E3/UL (ref 0.7–3.1)
LYMPHOCYTES NFR BLD AUTO: 40 %
MCH RBC QN AUTO: 30.3 PG (ref 26.6–33)
MCHC RBC AUTO-ENTMCNC: 33.6 G/DL (ref 31.5–35.7)
MCV RBC AUTO: 90 FL (ref 79–97)
MONOCYTES # BLD AUTO: 0.3 X10E3/UL (ref 0.1–0.9)
MONOCYTES NFR BLD AUTO: 8 %
NEUTROPHILS # BLD AUTO: 1.6 X10E3/UL (ref 1.4–7)
NEUTROPHILS NFR BLD AUTO: 49 %
PLATELET # BLD AUTO: 222 X10E3/UL (ref 150–379)
POTASSIUM SERPL-SCNC: 4.5 MMOL/L (ref 3.5–5.2)
PROT SERPL-MCNC: 8.1 G/DL (ref 6–8.5)
RBC # BLD AUTO: 5.05 X10E6/UL (ref 4.14–5.8)
SODIUM SERPL-SCNC: 145 MMOL/L (ref 134–144)
T4 FREE SERPL-MCNC: 1.24 NG/DL (ref 0.82–1.77)
TRIGL SERPL-MCNC: 66 MG/DL (ref 0–149)
TSH SERPL DL<=0.005 MIU/L-ACNC: 1.42 UIU/ML (ref 0.45–4.5)
VLDLC SERPL CALC-MCNC: 13 MG/DL (ref 5–40)
WBC # BLD AUTO: 3.2 X10E3/UL (ref 3.4–10.8)

## 2020-03-24 DIAGNOSIS — J30.89 PERENNIAL ALLERGIC RHINITIS: ICD-10-CM

## 2020-03-24 DIAGNOSIS — J45.20 MILD INTERMITTENT ASTHMA WITHOUT COMPLICATION: ICD-10-CM

## 2020-03-24 RX ORDER — MONTELUKAST SODIUM 10 MG/1
10 TABLET ORAL DAILY
Qty: 90 TAB | Refills: 3 | Status: SHIPPED | OUTPATIENT
Start: 2020-03-24 | End: 2021-04-08

## 2020-04-13 ENCOUNTER — VIRTUAL VISIT (OUTPATIENT)
Dept: INTERNAL MEDICINE CLINIC | Age: 40
End: 2020-04-13

## 2020-04-13 VITALS — WEIGHT: 213.5 LBS | BODY MASS INDEX: 30.63 KG/M2

## 2020-04-13 DIAGNOSIS — J45.20 MILD INTERMITTENT ASTHMA WITHOUT COMPLICATION: Primary | ICD-10-CM

## 2020-04-13 RX ORDER — BUDESONIDE AND FORMOTEROL FUMARATE DIHYDRATE 80; 4.5 UG/1; UG/1
2 AEROSOL RESPIRATORY (INHALATION) 2 TIMES DAILY
Qty: 1 INHALER | Refills: 11 | Status: SHIPPED | OUTPATIENT
Start: 2020-04-13 | End: 2021-04-05

## 2020-04-13 RX ORDER — ALBUTEROL SULFATE 90 UG/1
1 AEROSOL, METERED RESPIRATORY (INHALATION)
Qty: 1 INHALER | Refills: 11 | Status: SHIPPED | OUTPATIENT
Start: 2020-04-13 | End: 2021-04-19

## 2020-04-13 NOTE — PROGRESS NOTES
Sabrina Ruff is a 35 y.o. male who was seen by synchronous (real-time) audio-video technology on 4/13/2020. He confirmed that, for purposes of billing, this is a virtual visit with his provider for which we will submit a claim for reimbursement to his insurance company. He is aware that he will be responsible for any copays, coinsurance amounts or other amounts not covered by his insurance company. Do you accept - YES    This visit was completed was completed virtually using [email or web address]. Assessment & Plan:   Diagnoses and all orders for this visit:    1. Mild intermittent asthma without complication  -     albuterol (ProAir HFA) 90 mcg/actuation inhaler; Take 1 Puff by inhalation every four (4) hours as needed for Wheezing or Shortness of Breath. -     budesonide-formoteroL (SYMBICORT) 80-4.5 mcg/actuation HFAA; Take 2 Puffs by inhalation two (2) times a day. Subjective:   Sabrina Ruff. was seen for Establish Care  Previously seeing NP Lety Parker. He has 3 kids aged 16, 15 and 10 years. 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 Labs reviewed from last year. Asthma: he is on symbicort, albuterol, Singulair. He does not regularly take the Singulair. He notes good control of asthma and uses the albuterol 2-3 times a week. He currently denies wheezing, SOB, chest pain. Prior to Admission medications    Medication Sig Start Date End Date Taking? Authorizing Provider   montelukast (SINGULAIR) 10 mg tablet Take 1 Tab by mouth daily. 3/24/20   Dax Martinez NP   budesonide-formoterol (SYMBICORT) 80-4.5 mcg/actuation HFAA Take 2 Puffs by inhalation two (2) times a day. 4/10/19   Francheska Jacobs NP   albuterol (PROAIR HFA) 90 mcg/actuation inhaler Take 1 Puff by inhalation every four (4) hours as needed for Wheezing or Shortness of Breath.  4/10/19   Francheska Jacobs NP   fluticasone (FLONASE) 50 mcg/actuation nasal spray 2 Sprays by Both Nostrils route daily. 9/29/17   Francheska Jacobs NP   Cetirizine (ZYRTEC) 10 mg cap Take  by mouth. Provider, Historical       No Known Allergies    Past Medical History:   Diagnosis Date    Asthma     since childhood    H/O lithotripsy 2013    Hyperthyroidism 2011    Dx in [place]     No past surgical history on file. ROS  Comprehensive ROS negative      Objective:     General: alert, cooperative, no distress   Mental  status: normal mood, behavior, speech, dress, motor activity, and thought processes, able to follow commands   Eyes: EOM intact, normal sclera   Mouth: mucous membranes moist   Neck: no visualized mass   Resp: normal effort and no respiratory distress   Neuro: no gross deficits   Musculoskeletal: normal ROM of neck and normal gait w/o ataxia   Skin: no discoloration or lesions of concern on visible areas   Psychiatric: normal affect, no hallucinations         Due to this being a TeleHealth evaluation, many elements of the physical examination are unable to be assessed. Pursuant to the emergency declaration under the [facility], [facility] waiver authority and the [employer] Act, this Virtual  Visit was conducted, with patient's consent, to reduce the patient's risk of exposure to COVID-19 and provide continuity of care for an established patient. Services were provided through a video synchronous discussion virtually to substitute for in-person clinic visit. We discussed the expected course, resolution and complications of the diagnosis(es) in detail. Medication risks, benefits, costs, interactions, and alternatives were discussed as indicated. I advised him to contact the office if his condition worsens, changes or fails to improve as anticipated. He expressed understanding with the diagnosis(es) and plan.      Nirav Camarena NP

## 2021-04-04 DIAGNOSIS — J30.89 PERENNIAL ALLERGIC RHINITIS: ICD-10-CM

## 2021-04-04 DIAGNOSIS — J45.20 MILD INTERMITTENT ASTHMA WITHOUT COMPLICATION: ICD-10-CM

## 2021-04-05 RX ORDER — BUDESONIDE AND FORMOTEROL FUMARATE DIHYDRATE 80; 4.5 UG/1; UG/1
AEROSOL RESPIRATORY (INHALATION)
Qty: 30.6 INHALER | Refills: 3 | Status: SHIPPED | OUTPATIENT
Start: 2021-04-05 | End: 2022-03-29 | Stop reason: SDUPTHER

## 2021-04-08 RX ORDER — MONTELUKAST SODIUM 10 MG/1
TABLET ORAL
Qty: 90 TAB | Refills: 1 | Status: SHIPPED | OUTPATIENT
Start: 2021-04-08 | End: 2021-09-30 | Stop reason: SDUPTHER

## 2021-04-19 DIAGNOSIS — J45.20 MILD INTERMITTENT ASTHMA WITHOUT COMPLICATION: ICD-10-CM

## 2021-04-19 RX ORDER — ALBUTEROL SULFATE 90 UG/1
1 AEROSOL, METERED RESPIRATORY (INHALATION)
Qty: 8.5 INHALER | Refills: 11 | Status: SHIPPED | OUTPATIENT
Start: 2021-04-19 | End: 2022-06-20 | Stop reason: SDUPTHER

## 2021-04-19 NOTE — LETTER
4/23/2021 10:50 AM 
 
Mr. Garfield Castillo [place] [id number] We have tried reaching you by phone unsuccessfully. You are due for a follow up appointment at this time. Please call the number above upon receiving this letter to schedule your appointment.  
 
 
Sincerely, 
 
 
Leslie Coleman NP

## 2021-04-22 ENCOUNTER — PATIENT MESSAGE (OUTPATIENT)
Dept: INTERNAL MEDICINE CLINIC | Age: 41
End: 2021-04-22

## 2021-06-21 ENCOUNTER — OFFICE VISIT (OUTPATIENT)
Dept: INTERNAL MEDICINE CLINIC | Age: 41
End: 2021-06-21
Payer: COMMERCIAL

## 2021-06-21 VITALS
RESPIRATION RATE: 16 BRPM | HEIGHT: 70 IN | DIASTOLIC BLOOD PRESSURE: 90 MMHG | SYSTOLIC BLOOD PRESSURE: 122 MMHG | WEIGHT: 226 LBS | HEART RATE: 82 BPM | OXYGEN SATURATION: 99 % | BODY MASS INDEX: 32.35 KG/M2 | TEMPERATURE: 97.8 F

## 2021-06-21 DIAGNOSIS — Z86.39 H/O THYROID DISEASE: ICD-10-CM

## 2021-06-21 DIAGNOSIS — Z00.00 ENCOUNTER FOR GENERAL ADULT MEDICAL EXAMINATION W/O ABNORMAL FINDINGS: Primary | ICD-10-CM

## 2021-06-21 DIAGNOSIS — R03.0 ELEVATED BP WITHOUT DIAGNOSIS OF HYPERTENSION: ICD-10-CM

## 2021-06-21 DIAGNOSIS — Z82.49 FAMILY HISTORY OF CARDIOVASCULAR DISEASE: ICD-10-CM

## 2021-06-21 DIAGNOSIS — Z23 ENCOUNTER FOR IMMUNIZATION: ICD-10-CM

## 2021-06-21 DIAGNOSIS — Z11.59 ENCOUNTER FOR HEPATITIS C SCREENING TEST FOR LOW RISK PATIENT: ICD-10-CM

## 2021-06-21 PROCEDURE — 90715 TDAP VACCINE 7 YRS/> IM: CPT | Performed by: NURSE PRACTITIONER

## 2021-06-21 PROCEDURE — 99396 PREV VISIT EST AGE 40-64: CPT | Performed by: NURSE PRACTITIONER

## 2021-06-21 NOTE — PROGRESS NOTES
Subjective:      Kasandra Boeck is a 42 y.o. male who presents today for CPE. He notes he is feeling well, no complaints. Health Maintenance  Immunizations:    Influenza: n/a. Tetanus: not UTD- will do today. Gardasil: n/a. Cancer screening:    Reviewed testicular, prostate, and colon cancer screening guidelines. Body mass index is 32.66 kg/m². Wt Readings from Last 3 Encounters:   06/21/21 226 lb (102.5 kg)   04/13/20 213 lb 8 oz (96.8 kg)   04/10/19 217 lb 12.8 oz (98.8 kg)     Patient Care Team:  Linda Hathaway NP as PCP - General (Nurse Practitioner)  Linda Hathaway NP as PCP - [facility] Empaneled Provider  Gene Valenzuela MD as Consulting Provider (Endocrinology)       The following sections were reviewed & updated as appropriate: PMH, PSH, FH, and SH. Patient Active Problem List    Diagnosis Date Noted    Low energy 12/19/2017    Family history of cardiovascular disease 01/06/2017    Perennial allergic rhinitis 08/05/2014    H/O thyroid disease 08/05/2014    Tobacco dependence 08/05/2014     Current Outpatient Medications   Medication Sig Dispense Refill    albuterol (PROVENTIL HFA, VENTOLIN HFA, PROAIR HFA) 90 mcg/actuation inhaler TAKE 1 PUFF BY INHALATION EVERY FOUR (4) HOURS AS NEEDED FOR WHEEZING OR SHORTNESS OF BREATH. 8.5 Inhaler 11    montelukast (SINGULAIR) 10 mg tablet TAKE 1 TABLET BY MOUTH EVERY DAY 90 Tab 1    Symbicort 80-4.5 mcg/actuation HFAA TAKE 2 PUFFS BY INHALATION TWO (2) TIMES A DAY. 30.6 Inhaler 3    Cetirizine (ZYRTEC) 10 mg cap Take  by mouth. No Known Allergies  Past Medical History:   Diagnosis Date    Asthma     since childhood    H/O lithotripsy 2013    Hyperthyroidism 2011    Dx in [facility]    A comprehensive review of systems was negative except for that written in the HPI.      Objective:     Visit Vitals  BP (!) 122/90 (BP 1 Location: Right arm, BP Patient Position: Sitting, BP Cuff Size: Large adult)   Pulse 82   Temp 97.8 °F (36.6 °C) (Temporal)   Resp 16   Ht 5' 9.75\" (1.772 m)   Wt 226 lb (102.5 kg)   SpO2 99%   BMI 32.66 kg/m²       General:  Alert, cooperative, no distress, appears stated age,. Head:  Normocephalic, without obvious abnormality, atraumatic. Eyes:  Conjunctivae/corneas clear. PERRL, EOMs intact. Ears:  Normal TMs and external ear canals both ears. Throat: Deferred   Neck: Supple, symmetrical, trachea midline, no adenopathy, thyroid: no enlargement/tenderness/nodules, no carotid bruit and no JVD. Back:   Symmetric, no curvature. ROM normal. No CVA tenderness. Lungs:   Clear to auscultation bilaterally. Chest wall:  No tenderness or deformity. Heart:  Regular rate and rhythm, S1, S2 normal, no murmur, click, rub or gallop. Abdomen:   Soft, non-tender. Bowel sounds normal. No masses,  No organomegaly. Extremities: Extremities normal, atraumatic, no cyanosis or edema. Pulses: 2+ and symmetric all extremities. Skin: Skin color, texture, turgor normal. No rashes or lesions. Lymph nodes: Cervical, supraclavicular, and axillary nodes normal.   Neurologic: CNII-XII intact. Normal strength, sensation throughout. Nursing note and vitals reviewed  Assessment/Plan:        1. Encounter for general adult medical examination w/o abnormal findings  - CBC WITH AUTOMATED DIFF; Future  - LIPID PANEL; Future  - METABOLIC PANEL, COMPREHENSIVE; Future    2. Encounter for hepatitis C screening test for low risk patient  - HEPATITIS C AB; Future    3. Family history of cardiovascular disease    4. H/O thyroid disease  - TSH 3RD GENERATION; Future    5. Encounter for immunization  - TETANUS, DIPHTHERIA TOXOIDS AND ACELLULAR PERTUSSIS VACCINE (TDAP), IN INDIVIDS. >=7, IM    6. Elevated BP without diagnosis of hypertension  - he will send readings from home      Follow-up and Dispositions    · Return for Get fasting labs drawn. Follow up with PCP Shane Rodriguez NP as needed. Advised him to call back or return to office if symptoms worsen/change/persist.  Discussed expected course/resolution/complications of diagnosis in detail with patient. Medication risks/benefits/costs/interactions/alternatives discussed with patient. He was given an after visit summary which includes diagnoses, current medications, & vitals. He expressed understanding with the diagnosis and plan.

## 2021-09-30 DIAGNOSIS — J45.20 MILD INTERMITTENT ASTHMA WITHOUT COMPLICATION: ICD-10-CM

## 2021-09-30 DIAGNOSIS — J30.89 PERENNIAL ALLERGIC RHINITIS: ICD-10-CM

## 2021-10-04 ENCOUNTER — PATIENT MESSAGE (OUTPATIENT)
Dept: INTERNAL MEDICINE CLINIC | Age: 41
End: 2021-10-04

## 2021-10-04 RX ORDER — MONTELUKAST SODIUM 10 MG/1
10 TABLET ORAL DAILY
Qty: 30 TABLET | Refills: 0 | Status: SHIPPED | OUTPATIENT
Start: 2021-10-04 | End: 2021-10-29

## 2021-10-29 DIAGNOSIS — J45.20 MILD INTERMITTENT ASTHMA WITHOUT COMPLICATION: ICD-10-CM

## 2021-10-29 DIAGNOSIS — J30.89 PERENNIAL ALLERGIC RHINITIS: ICD-10-CM

## 2021-10-29 RX ORDER — MONTELUKAST SODIUM 10 MG/1
10 TABLET ORAL DAILY
Qty: 30 TABLET | Refills: 0 | Status: SHIPPED | OUTPATIENT
Start: 2021-10-29 | End: 2022-06-20 | Stop reason: SDUPTHER

## 2022-03-19 PROBLEM — R53.83 LOW ENERGY: Status: ACTIVE | Noted: 2017-12-19

## 2022-03-19 PROBLEM — Z82.49 FAMILY HISTORY OF CARDIOVASCULAR DISEASE: Status: ACTIVE | Noted: 2017-01-06

## 2022-03-29 ENCOUNTER — TELEPHONE (OUTPATIENT)
Dept: INTERNAL MEDICINE CLINIC | Age: 42
End: 2022-03-29

## 2022-03-29 DIAGNOSIS — J45.20 MILD INTERMITTENT ASTHMA WITHOUT COMPLICATION: ICD-10-CM

## 2022-03-31 RX ORDER — BUDESONIDE AND FORMOTEROL FUMARATE DIHYDRATE 80; 4.5 UG/1; UG/1
2 AEROSOL RESPIRATORY (INHALATION) 2 TIMES DAILY
Qty: 1 EACH | Refills: 4 | Status: SHIPPED | OUTPATIENT
Start: 2022-03-31 | End: 2022-06-20 | Stop reason: SDUPTHER

## 2022-03-31 NOTE — TELEPHONE ENCOUNTER
Patient has a NP appt with [facility] scheduled for 06/20/2022 per chart

## 2022-06-20 ENCOUNTER — OFFICE VISIT (OUTPATIENT)
Dept: INTERNAL MEDICINE CLINIC | Age: 42
End: 2022-06-20
Payer: COMMERCIAL

## 2022-06-20 VITALS
TEMPERATURE: 97.4 F | DIASTOLIC BLOOD PRESSURE: 92 MMHG | RESPIRATION RATE: 16 BRPM | OXYGEN SATURATION: 98 % | HEART RATE: 81 BPM | WEIGHT: 231 LBS | SYSTOLIC BLOOD PRESSURE: 156 MMHG | HEIGHT: 70 IN | BODY MASS INDEX: 33.07 KG/M2

## 2022-06-20 DIAGNOSIS — J45.20 MILD INTERMITTENT ASTHMA WITHOUT COMPLICATION: ICD-10-CM

## 2022-06-20 DIAGNOSIS — Z00.00 WELL ADULT EXAM: Primary | ICD-10-CM

## 2022-06-20 DIAGNOSIS — J30.89 PERENNIAL ALLERGIC RHINITIS: ICD-10-CM

## 2022-06-20 DIAGNOSIS — F17.200 CURRENT SMOKER: ICD-10-CM

## 2022-06-20 DIAGNOSIS — R03.0 ELEVATED BP WITHOUT DIAGNOSIS OF HYPERTENSION: ICD-10-CM

## 2022-06-20 DIAGNOSIS — E55.9 VITAMIN D DEFICIENCY: ICD-10-CM

## 2022-06-20 PROBLEM — J45.909 MILD ASTHMA: Status: ACTIVE | Noted: 2022-06-20

## 2022-06-20 PROCEDURE — 99214 OFFICE O/P EST MOD 30 MIN: CPT | Performed by: INTERNAL MEDICINE

## 2022-06-20 RX ORDER — MONTELUKAST SODIUM 10 MG/1
10 TABLET ORAL DAILY
Qty: 30 TABLET | Refills: 0 | Status: SHIPPED | OUTPATIENT
Start: 2022-06-20 | End: 2022-07-12

## 2022-06-20 RX ORDER — ALBUTEROL SULFATE 90 UG/1
1 AEROSOL, METERED RESPIRATORY (INHALATION)
Qty: 1 EACH | Refills: 1 | Status: SHIPPED | OUTPATIENT
Start: 2022-06-20 | End: 2022-08-19

## 2022-06-20 RX ORDER — BUDESONIDE AND FORMOTEROL FUMARATE DIHYDRATE 80; 4.5 UG/1; UG/1
2 AEROSOL RESPIRATORY (INHALATION) 2 TIMES DAILY
Qty: 1 EACH | Refills: 4 | Status: SHIPPED | OUTPATIENT
Start: 2022-06-20

## 2022-06-20 NOTE — PROGRESS NOTES
HISTORY OF PRESENT ILLNESS  Eulogio Bell is a 36 y.o. male. Patient was seen to establish care. PMH of allergies, asthma and current smoker. Will need lab work as the last was done a year ago. BP: this has been an issue in the past. When he checks his BP random, it has been elevated. Does not want to be on medications. But reports that he eats good and works out. Does not add salt. Does have a significantly family history of HTN. Is a current smoker. Not sure about stopping. Needs refills on her allergy and asthmatic medications. This does help control. Visit Vitals  BP (!) 156/92 (BP 1 Location: Right upper arm, BP Patient Position: Sitting, BP Cuff Size: Adult)   Pulse 81   Temp 97.4 °F (36.3 °C) (Oral)   Resp 16   Ht 5' 9.75\" (1.772 m)   Wt 231 lb (104.8 kg)   SpO2 98%   BMI 33.38 kg/m²     Past Medical History:   Diagnosis Date    Asthma     since childhood    H/O lithotripsy 2013    Hyperthyroidism 2011    Dx in [place]     History reviewed. No pertinent surgical history. Family History   Problem Relation Age of Onset    Heart Disease Father         Heart failure 45s    Hypertension Father     Breast Cancer Maternal Grandmother     Hypertension Sister      Outpatient Encounter Medications as of 6/20/2022   Medication Sig Dispense Refill    montelukast (SINGULAIR) 10 mg tablet Take 1 Tablet by mouth daily. Need to establish with a new primary care provider for further refills. 30 Tablet 0    albuterol (PROVENTIL HFA, VENTOLIN HFA, PROAIR HFA) 90 mcg/actuation inhaler Take 1 Puff by inhalation every four (4) hours as needed for Wheezing or Shortness of Breath. 1 Each 1    budesonide-formoteroL (Symbicort) 80-4.5 mcg/actuation HFAA Take 2 Puffs by inhalation two (2) times a day. 1 Each 4    Cetirizine (ZYRTEC) 10 mg cap Take  by mouth.  [DISCONTINUED] budesonide-formoteroL (Symbicort) 80-4.5 mcg/actuation HFAA Take 2 Puffs by inhalation two (2) times a day.  1 Each 4    [DISCONTINUED] montelukast (SINGULAIR) 10 mg tablet Take 1 Tablet by mouth daily. Need to establish with a new primary care provider for further refills. 30 Tablet 0    [DISCONTINUED] albuterol (PROVENTIL HFA, VENTOLIN HFA, PROAIR HFA) 90 mcg/actuation inhaler TAKE 1 PUFF BY INHALATION EVERY FOUR (4) HOURS AS NEEDED FOR WHEEZING OR SHORTNESS OF BREATH. 8.5 Inhaler 11     No facility-administered encounter medications on file as of 6/20/2022. HPI    Review of Systems   All other systems reviewed and are negative. Physical Exam  Vitals and nursing note reviewed. Cardiovascular:      Rate and Rhythm: Normal rate and regular rhythm. Pulmonary:      Effort: Pulmonary effort is normal.      Breath sounds: Normal breath sounds. Abdominal:      General: Bowel sounds are normal.      Palpations: Abdomen is soft. Musculoskeletal:      Cervical back: Neck supple. Right lower leg: No edema. Left lower leg: No edema. Skin:     General: Skin is warm. Neurological:      Mental Status: He is alert and oriented to person, place, and time. Psychiatric:         Behavior: Behavior normal.         ASSESSMENT and PLAN  Diagnoses and all orders for this visit:    1. Well adult exam  -     METABOLIC PANEL, COMPREHENSIVE; Future  -     CBC WITH AUTOMATED DIFF; Future  -     TSH 3RD GENERATION; Future  -     LIPID PANEL; Future    2. Mild intermittent asthma without complication  -     montelukast (SINGULAIR) 10 mg tablet; Take 1 Tablet by mouth daily. Need to establish with a new primary care provider for further refills. -     albuterol (PROVENTIL HFA, VENTOLIN HFA, PROAIR HFA) 90 mcg/actuation inhaler; Take 1 Puff by inhalation every four (4) hours as needed for Wheezing or Shortness of Breath. -     budesonide-formoteroL (Symbicort) 80-4.5 mcg/actuation HFAA; Take 2 Puffs by inhalation two (2) times a day. 3. Perennial allergic rhinitis  -     montelukast (SINGULAIR) 10 mg tablet;  Take 1 Tablet by mouth daily. Need to establish with a new primary care provider for further refills. 4. Vitamin D deficiency  -     VITAMIN D, 25 HYDROXY; Future    5. Current smoker        -      Educated on quitting and assistance to do this      6.  Elevated BP without the diagnosis of hypertension        -      Asked patient to monitor BP for 2 weeks and return with recordings         -      DASH diet     Follow-up and Dispositions    · Return in 2 weeks (on 7/4/2022), or if symptoms worsen or fail to improve.       lab results and schedule of future lab studies reviewed with patient  reviewed diet, exercise and weight control  very strongly urged to quit smoking to reduce cardiovascular risk  reviewed medications and side effects in detail

## 2022-06-20 NOTE — PROGRESS NOTES
Health Maintenance Due   Topic Date Due    Pneumococcal 0-64 years (1 - PCV) Never done    COVID-19 Vaccine (3 - Booster for Pfizer series) 10/31/2021    Depression Screen  06/21/2022       Chief Complaint   Patient presents with    New Patient    Hypertension    Blurred Vision       1. Have you been to the ER, urgent care clinic since your last visit? Hospitalized since your last visit? No    2. Have you seen or consulted any other health care providers outside of the [address] [place] [address] since your last visit? Include any pap smears or colon screening. No    3) Do you have an Advance Directive on file? no    4) Are you interested in receiving information on Advance Directives? NO      Patient is accompanied by self I have received verbal consent from Constance Wheeler Sr. to discuss any/all medical information while they are present in the room.

## 2022-06-21 LAB
25(OH)D3+25(OH)D2 SERPL-MCNC: 25 NG/ML (ref 30–100)
ALBUMIN SERPL-MCNC: 4.9 G/DL (ref 4–5)
ALBUMIN/GLOB SERPL: 1.7 {RATIO} (ref 1.2–2.2)
ALP SERPL-CCNC: 98 IU/L (ref 44–121)
ALT SERPL-CCNC: 29 IU/L (ref 0–44)
AST SERPL-CCNC: 29 IU/L (ref 0–40)
BASOPHILS # BLD AUTO: 0 X10E3/UL (ref 0–0.2)
BASOPHILS NFR BLD AUTO: 0 %
BILIRUB SERPL-MCNC: 0.4 MG/DL (ref 0–1.2)
BUN SERPL-MCNC: 19 MG/DL (ref 6–24)
BUN/CREAT SERPL: 22 (ref 9–20)
CALCIUM SERPL-MCNC: 9.6 MG/DL (ref 8.7–10.2)
CHLORIDE SERPL-SCNC: 101 MMOL/L (ref 96–106)
CHOLEST SERPL-MCNC: 158 MG/DL (ref 100–199)
CO2 SERPL-SCNC: 22 MMOL/L (ref 20–29)
CREAT SERPL-MCNC: 0.87 MG/DL (ref 0.76–1.27)
EGFR: 110 ML/MIN/1.73
EOSINOPHIL # BLD AUTO: 0.1 X10E3/UL (ref 0–0.4)
EOSINOPHIL NFR BLD AUTO: 2 %
ERYTHROCYTE [DISTWIDTH] IN BLOOD BY AUTOMATED COUNT: 13.1 % (ref 11.6–15.4)
GLOBULIN SER CALC-MCNC: 2.9 G/DL (ref 1.5–4.5)
GLUCOSE SERPL-MCNC: 85 MG/DL (ref 65–99)
HCT VFR BLD AUTO: 43 % (ref 37.5–51)
HDLC SERPL-MCNC: 58 MG/DL
HGB BLD-MCNC: 14.6 G/DL (ref 13–17.7)
IMM GRANULOCYTES # BLD AUTO: 0 X10E3/UL (ref 0–0.1)
IMM GRANULOCYTES NFR BLD AUTO: 0 %
IMP & REVIEW OF LAB RESULTS: NORMAL
LDLC SERPL CALC-MCNC: 89 MG/DL (ref 0–99)
LYMPHOCYTES # BLD AUTO: 2 X10E3/UL (ref 0.7–3.1)
LYMPHOCYTES NFR BLD AUTO: 46 %
MCH RBC QN AUTO: 30.2 PG (ref 26.6–33)
MCHC RBC AUTO-ENTMCNC: 34 G/DL (ref 31.5–35.7)
MCV RBC AUTO: 89 FL (ref 79–97)
MONOCYTES # BLD AUTO: 0.4 X10E3/UL (ref 0.1–0.9)
MONOCYTES NFR BLD AUTO: 9 %
NEUTROPHILS # BLD AUTO: 1.9 X10E3/UL (ref 1.4–7)
NEUTROPHILS NFR BLD AUTO: 43 %
PLATELET # BLD AUTO: 191 X10E3/UL (ref 150–450)
POTASSIUM SERPL-SCNC: 4.4 MMOL/L (ref 3.5–5.2)
PROT SERPL-MCNC: 7.8 G/DL (ref 6–8.5)
RBC # BLD AUTO: 4.83 X10E6/UL (ref 4.14–5.8)
SODIUM SERPL-SCNC: 139 MMOL/L (ref 134–144)
TRIGL SERPL-MCNC: 51 MG/DL (ref 0–149)
TSH SERPL DL<=0.005 MIU/L-ACNC: 1.76 UIU/ML (ref 0.45–4.5)
VLDLC SERPL CALC-MCNC: 11 MG/DL (ref 5–40)
WBC # BLD AUTO: 4.5 X10E3/UL (ref 3.4–10.8)

## 2022-07-12 DIAGNOSIS — J45.20 MILD INTERMITTENT ASTHMA WITHOUT COMPLICATION: ICD-10-CM

## 2022-07-12 DIAGNOSIS — J30.89 PERENNIAL ALLERGIC RHINITIS: ICD-10-CM

## 2022-07-12 RX ORDER — MONTELUKAST SODIUM 10 MG/1
10 TABLET ORAL DAILY
Qty: 30 TABLET | Refills: 0 | Status: SHIPPED | OUTPATIENT
Start: 2022-07-12 | End: 2022-07-12 | Stop reason: SDUPTHER

## 2022-08-19 DIAGNOSIS — J45.20 MILD INTERMITTENT ASTHMA WITHOUT COMPLICATION: ICD-10-CM

## 2022-08-19 RX ORDER — ALBUTEROL SULFATE 90 UG/1
AEROSOL, METERED RESPIRATORY (INHALATION)
Qty: 6.7 EACH | Refills: 1 | Status: SHIPPED | OUTPATIENT
Start: 2022-08-19 | End: 2022-10-17

## 2022-10-11 DIAGNOSIS — J45.20 MILD INTERMITTENT ASTHMA WITHOUT COMPLICATION: ICD-10-CM

## 2022-10-11 DIAGNOSIS — J30.89 PERENNIAL ALLERGIC RHINITIS: ICD-10-CM

## 2022-10-11 RX ORDER — MONTELUKAST SODIUM 10 MG/1
TABLET ORAL
Qty: 90 TABLET | Refills: 0 | Status: SHIPPED | OUTPATIENT
Start: 2022-10-11

## 2022-10-17 DIAGNOSIS — J45.20 MILD INTERMITTENT ASTHMA WITHOUT COMPLICATION: ICD-10-CM

## 2022-10-17 RX ORDER — ALBUTEROL SULFATE 90 UG/1
AEROSOL, METERED RESPIRATORY (INHALATION)
Qty: 6.7 EACH | Refills: 1 | Status: SHIPPED | OUTPATIENT
Start: 2022-10-17

## 2023-01-11 DIAGNOSIS — J30.89 PERENNIAL ALLERGIC RHINITIS: ICD-10-CM

## 2023-01-11 DIAGNOSIS — J45.20 MILD INTERMITTENT ASTHMA WITHOUT COMPLICATION: ICD-10-CM

## 2023-01-11 RX ORDER — MONTELUKAST SODIUM 10 MG/1
TABLET ORAL
Qty: 90 TABLET | Refills: 0 | Status: SHIPPED | OUTPATIENT
Start: 2023-01-11

## 2023-03-17 DIAGNOSIS — J45.20 MILD INTERMITTENT ASTHMA WITHOUT COMPLICATION: ICD-10-CM

## 2023-03-17 DIAGNOSIS — J30.89 PERENNIAL ALLERGIC RHINITIS: ICD-10-CM

## 2023-03-17 RX ORDER — MONTELUKAST SODIUM 10 MG/1
10 TABLET ORAL DAILY
Qty: 90 TABLET | Refills: 0 | Status: SHIPPED | OUTPATIENT
Start: 2023-03-17

## 2023-03-17 RX ORDER — BUDESONIDE AND FORMOTEROL FUMARATE DIHYDRATE 80; 4.5 UG/1; UG/1
2 AEROSOL RESPIRATORY (INHALATION) 2 TIMES DAILY
Qty: 1 EACH | Refills: 4 | Status: SHIPPED | OUTPATIENT
Start: 2023-03-17

## 2023-03-17 RX ORDER — ALBUTEROL SULFATE 90 UG/1
1 AEROSOL, METERED RESPIRATORY (INHALATION)
Qty: 6.7 EACH | Refills: 1 | Status: SHIPPED | OUTPATIENT
Start: 2023-03-17

## 2023-03-22 ENCOUNTER — TELEPHONE (OUTPATIENT)
Dept: INTERNAL MEDICINE CLINIC | Age: 43
End: 2023-03-22

## 2023-03-22 DIAGNOSIS — J45.909 MILD ASTHMA, UNSPECIFIED WHETHER COMPLICATED, UNSPECIFIED WHETHER PERSISTENT: Primary | ICD-10-CM

## 2023-03-22 DIAGNOSIS — J45.20 MILD INTERMITTENT ASTHMA WITHOUT COMPLICATION: ICD-10-CM

## 2023-03-22 RX ORDER — FLUTICASONE PROPIONATE AND SALMETEROL XINAFOATE 45; 21 UG/1; UG/1
2 AEROSOL, METERED RESPIRATORY (INHALATION) 2 TIMES DAILY
Qty: 12 G | Refills: 0 | Status: SHIPPED | OUTPATIENT
Start: 2023-03-22

## 2023-04-23 DIAGNOSIS — J45.909 MILD ASTHMA, UNSPECIFIED WHETHER COMPLICATED, UNSPECIFIED WHETHER PERSISTENT: ICD-10-CM

## 2023-04-23 DIAGNOSIS — J45.20 MILD INTERMITTENT ASTHMA WITHOUT COMPLICATION: ICD-10-CM

## 2023-04-23 RX ORDER — FLUTICASONE PROPIONATE AND SALMETEROL XINAFOATE 45; 21 UG/1; UG/1
AEROSOL, METERED RESPIRATORY (INHALATION)
Qty: 2 EACH | Refills: 1 | Status: SHIPPED | OUTPATIENT
Start: 2023-04-23

## 2023-05-13 DIAGNOSIS — J45.20 MILD INTERMITTENT ASTHMA, UNCOMPLICATED: ICD-10-CM

## 2023-05-15 RX ORDER — ALBUTEROL SULFATE 90 UG/1
AEROSOL, METERED RESPIRATORY (INHALATION)
Qty: 6.7 EACH | Refills: 1 | Status: SHIPPED | OUTPATIENT
Start: 2023-05-15

## 2023-07-03 DIAGNOSIS — J30.89 OTHER ALLERGIC RHINITIS: ICD-10-CM

## 2023-07-03 DIAGNOSIS — J45.20 MILD INTERMITTENT ASTHMA, UNCOMPLICATED: ICD-10-CM

## 2023-07-03 RX ORDER — MONTELUKAST SODIUM 10 MG/1
TABLET ORAL
Qty: 90 TABLET | Refills: 1 | Status: SHIPPED | OUTPATIENT
Start: 2023-07-03

## 2023-07-14 ENCOUNTER — OFFICE VISIT (OUTPATIENT)
Age: 43
End: 2023-07-14

## 2023-07-14 VITALS
WEIGHT: 234.8 LBS | BODY MASS INDEX: 32.87 KG/M2 | RESPIRATION RATE: 18 BRPM | TEMPERATURE: 98.3 F | SYSTOLIC BLOOD PRESSURE: 120 MMHG | HEART RATE: 88 BPM | OXYGEN SATURATION: 98 % | HEIGHT: 71 IN | DIASTOLIC BLOOD PRESSURE: 70 MMHG

## 2023-07-14 DIAGNOSIS — F17.200 NICOTINE DEPENDENCE, UNCOMPLICATED, UNSPECIFIED NICOTINE PRODUCT TYPE: ICD-10-CM

## 2023-07-14 DIAGNOSIS — Z00.00 WELL ADULT EXAM: ICD-10-CM

## 2023-07-14 DIAGNOSIS — Z12.5 SCREENING PSA (PROSTATE SPECIFIC ANTIGEN): ICD-10-CM

## 2023-07-14 DIAGNOSIS — E55.9 VITAMIN D DEFICIENCY, UNSPECIFIED: ICD-10-CM

## 2023-07-14 DIAGNOSIS — J45.20 MILD INTERMITTENT ASTHMA, UNCOMPLICATED: ICD-10-CM

## 2023-07-14 DIAGNOSIS — Z00.00 WELL ADULT EXAM: Primary | ICD-10-CM

## 2023-07-14 LAB
BASOPHILS # BLD AUTO: 0 X10E3/UL (ref 0–0.2)
BASOPHILS NFR BLD AUTO: 1 %
EOSINOPHIL # BLD AUTO: 0.1 X10E3/UL (ref 0–0.4)
EOSINOPHIL NFR BLD AUTO: 3 %
ERYTHROCYTE [DISTWIDTH] IN BLOOD BY AUTOMATED COUNT: 13.7 % (ref 11.6–15.4)
HCT VFR BLD AUTO: 45.1 % (ref 37.5–51)
HGB BLD-MCNC: 14.9 G/DL (ref 13–17.7)
IMM GRANULOCYTES # BLD AUTO: 0 X10E3/UL (ref 0–0.1)
IMM GRANULOCYTES NFR BLD AUTO: 0 %
LYMPHOCYTES # BLD AUTO: 1.5 X10E3/UL (ref 0.7–3.1)
LYMPHOCYTES NFR BLD AUTO: 42 %
MCH RBC QN AUTO: 29.5 PG (ref 26.6–33)
MCHC RBC AUTO-ENTMCNC: 33 G/DL (ref 31.5–35.7)
MCV RBC AUTO: 89 FL (ref 79–97)
MONOCYTES # BLD AUTO: 0.4 X10E3/UL (ref 0.1–0.9)
MONOCYTES NFR BLD AUTO: 10 %
NEUTROPHILS # BLD AUTO: 1.6 X10E3/UL (ref 1.4–7)
NEUTROPHILS NFR BLD AUTO: 44 %
PLATELET # BLD AUTO: 209 X10E3/UL (ref 150–450)
RBC # BLD AUTO: 5.05 X10E6/UL (ref 4.14–5.8)
WBC # BLD AUTO: 3.6 X10E3/UL (ref 3.4–10.8)

## 2023-07-14 RX ORDER — ALBUTEROL SULFATE 90 UG/1
AEROSOL, METERED RESPIRATORY (INHALATION)
Qty: 6.7 EACH | Refills: 1 | Status: SHIPPED | OUTPATIENT
Start: 2023-07-14

## 2023-07-14 RX ORDER — FLUTICASONE PROPIONATE AND SALMETEROL XINAFOATE 45; 21 UG/1; UG/1
2 AEROSOL, METERED RESPIRATORY (INHALATION) 2 TIMES DAILY
Qty: 1 EACH | Refills: 1 | Status: SHIPPED | OUTPATIENT
Start: 2023-07-14

## 2023-07-14 SDOH — ECONOMIC STABILITY: INCOME INSECURITY: HOW HARD IS IT FOR YOU TO PAY FOR THE VERY BASICS LIKE FOOD, HOUSING, MEDICAL CARE, AND HEATING?: NOT VERY HARD

## 2023-07-14 SDOH — ECONOMIC STABILITY: FOOD INSECURITY: WITHIN THE PAST 12 MONTHS, YOU WORRIED THAT YOUR FOOD WOULD RUN OUT BEFORE YOU GOT MONEY TO BUY MORE.: NEVER TRUE

## 2023-07-14 SDOH — ECONOMIC STABILITY: HOUSING INSECURITY
IN THE LAST 12 MONTHS, WAS THERE A TIME WHEN YOU DID NOT HAVE A STEADY PLACE TO SLEEP OR SLEPT IN A SHELTER (INCLUDING NOW)?: NO

## 2023-07-14 SDOH — ECONOMIC STABILITY: FOOD INSECURITY: WITHIN THE PAST 12 MONTHS, THE FOOD YOU BOUGHT JUST DIDN'T LAST AND YOU DIDN'T HAVE MONEY TO GET MORE.: NEVER TRUE

## 2023-07-14 ASSESSMENT — PATIENT HEALTH QUESTIONNAIRE - PHQ9
SUM OF ALL RESPONSES TO PHQ9 QUESTIONS 1 & 2: 0
SUM OF ALL RESPONSES TO PHQ QUESTIONS 1-9: 0
2. FEELING DOWN, DEPRESSED OR HOPELESS: 0
SUM OF ALL RESPONSES TO PHQ QUESTIONS 1-9: 0
1. LITTLE INTEREST OR PLEASURE IN DOING THINGS: 0
SUM OF ALL RESPONSES TO PHQ QUESTIONS 1-9: 0
SUM OF ALL RESPONSES TO PHQ QUESTIONS 1-9: 0

## 2023-07-14 ASSESSMENT — ENCOUNTER SYMPTOMS
SHORTNESS OF BREATH: 0
GASTROINTESTINAL NEGATIVE: 1
COUGH: 0
CHEST TIGHTNESS: 0

## 2023-07-15 LAB
25(OH)D3+25(OH)D2 SERPL-MCNC: 22.8 NG/ML (ref 30–100)
ALBUMIN SERPL-MCNC: 4.7 G/DL (ref 4.1–5.1)
ALBUMIN/GLOB SERPL: 1.5 {RATIO} (ref 1.2–2.2)
ALP SERPL-CCNC: 94 IU/L (ref 44–121)
ALT SERPL-CCNC: 44 IU/L (ref 0–44)
AST SERPL-CCNC: 42 IU/L (ref 0–40)
BILIRUB SERPL-MCNC: 0.4 MG/DL (ref 0–1.2)
BUN SERPL-MCNC: 19 MG/DL (ref 6–24)
BUN/CREAT SERPL: 18 (ref 9–20)
CALCIUM SERPL-MCNC: 9.3 MG/DL (ref 8.7–10.2)
CHLORIDE SERPL-SCNC: 105 MMOL/L (ref 96–106)
CHOLEST SERPL-MCNC: 166 MG/DL (ref 100–199)
CO2 SERPL-SCNC: 21 MMOL/L (ref 20–29)
CREAT SERPL-MCNC: 1.03 MG/DL (ref 0.76–1.27)
EGFRCR SERPLBLD CKD-EPI 2021: 92 ML/MIN/1.73
GLOBULIN SER CALC-MCNC: 3.1 G/DL (ref 1.5–4.5)
GLUCOSE SERPL-MCNC: 116 MG/DL (ref 70–99)
HDLC SERPL-MCNC: 47 MG/DL
IMP & REVIEW OF LAB RESULTS: NORMAL
LDLC SERPL CALC-MCNC: 107 MG/DL (ref 0–99)
POTASSIUM SERPL-SCNC: 4.2 MMOL/L (ref 3.5–5.2)
PROT SERPL-MCNC: 7.8 G/DL (ref 6–8.5)
PSA SERPL-MCNC: 0.2 NG/ML (ref 0–4)
SODIUM SERPL-SCNC: 140 MMOL/L (ref 134–144)
TRIGL SERPL-MCNC: 60 MG/DL (ref 0–149)
VLDLC SERPL CALC-MCNC: 12 MG/DL (ref 5–40)

## 2023-08-17 DIAGNOSIS — J45.20 MILD INTERMITTENT ASTHMA, UNCOMPLICATED: ICD-10-CM

## 2023-08-17 NOTE — TELEPHONE ENCOUNTER
Insurance only covers 90 day script on [facility]    [facility] [address]  [place]ov;7/14/23  No upcoming appointments

## 2023-08-18 RX ORDER — ALBUTEROL SULFATE 90 UG/1
AEROSOL, METERED RESPIRATORY (INHALATION)
Qty: 6.7 EACH | Refills: 2 | Status: SHIPPED | OUTPATIENT
Start: 2023-08-18

## 2023-08-18 NOTE — TELEPHONE ENCOUNTER
Requested Prescriptions     Pending Prescriptions Disp Refills    albuterol sulfate HFA (PROVENTIL;VENTOLIN;PROAIR) 108 (90 Base) MCG/ACT inhaler 6.7 each 2     Sig: INHALE 1 PUFF BY MOUTH EVERY 4 HOURS AS NEEDED FOR WHEEZE         [id number] - [place] [address] [phone] - F [phone]  [address] [facility] [address]  Phone: [phone] Fax: [phone]       Last appt 7/14/2023      No future appointments.

## 2023-08-29 ENCOUNTER — TELEPHONE (OUTPATIENT)
Age: 43
End: 2023-08-29

## 2023-08-29 DIAGNOSIS — J45.20 MILD INTERMITTENT ASTHMA, UNCOMPLICATED: ICD-10-CM

## 2023-08-29 RX ORDER — FLUTICASONE PROPIONATE AND SALMETEROL XINAFOATE 45; 21 UG/1; UG/1
2 AEROSOL, METERED RESPIRATORY (INHALATION) 2 TIMES DAILY
Qty: 12 G | Refills: 3 | Status: SHIPPED | OUTPATIENT
Start: 2023-08-29

## 2023-08-29 NOTE — TELEPHONE ENCOUNTER
Insurance sent a requests for 90 days for Advair. But when I go to send it in it says this is not on patients formulary and gives a list of preferred level two inhalers. Please advise.

## 2023-12-30 DIAGNOSIS — J30.89 OTHER ALLERGIC RHINITIS: ICD-10-CM

## 2023-12-30 DIAGNOSIS — J45.20 MILD INTERMITTENT ASTHMA, UNCOMPLICATED: ICD-10-CM

## 2024-01-02 RX ORDER — MONTELUKAST SODIUM 10 MG/1
TABLET ORAL
Qty: 90 TABLET | Refills: 1 | Status: SHIPPED | OUTPATIENT
Start: 2024-01-02

## 2024-01-02 NOTE — TELEPHONE ENCOUNTER
Last appt 7/14/2023      Next Apt:     No future appointments.      [id number] - [place] - [address] - P [phone] - F [phone]  [address] [place] [address]  Phone: [phone] Fax: [phone]

## 2024-05-15 DIAGNOSIS — J45.20 MILD INTERMITTENT ASTHMA, UNCOMPLICATED: ICD-10-CM

## 2024-05-15 RX ORDER — FLUTICASONE PROPIONATE AND SALMETEROL XINAFOATE 45; 21 UG/1; UG/1
2 AEROSOL, METERED RESPIRATORY (INHALATION) 2 TIMES DAILY
Qty: 12 G | Refills: 0 | Status: SHIPPED | OUTPATIENT
Start: 2024-05-15

## 2024-06-30 DIAGNOSIS — J45.20 MILD INTERMITTENT ASTHMA, UNCOMPLICATED: ICD-10-CM

## 2024-06-30 DIAGNOSIS — J30.89 OTHER ALLERGIC RHINITIS: ICD-10-CM

## 2024-07-01 RX ORDER — MONTELUKAST SODIUM 10 MG/1
TABLET ORAL
Qty: 90 TABLET | Refills: 1 | OUTPATIENT
Start: 2024-07-01

## 2024-08-13 DIAGNOSIS — J45.20 MILD INTERMITTENT ASTHMA, UNCOMPLICATED: ICD-10-CM

## 2024-08-13 RX ORDER — FLUTICASONE PROPIONATE AND SALMETEROL XINAFOATE 45; 21 UG/1; UG/1
2 AEROSOL, METERED RESPIRATORY (INHALATION) 2 TIMES DAILY
Qty: 1 EACH | Refills: 0 | Status: SHIPPED | OUTPATIENT
Start: 2024-08-13 | End: 2024-08-14 | Stop reason: SDUPTHER

## 2024-08-13 RX ORDER — ALBUTEROL SULFATE 90 UG/1
AEROSOL, METERED RESPIRATORY (INHALATION)
Qty: 1 EACH | Refills: 0 | Status: SHIPPED | OUTPATIENT
Start: 2024-08-13

## 2024-08-13 RX ORDER — FLUTICASONE PROPIONATE AND SALMETEROL XINAFOATE 45; 21 UG/1; UG/1
2 AEROSOL, METERED RESPIRATORY (INHALATION) 2 TIMES DAILY
Qty: 12 G | Refills: 0 | OUTPATIENT
Start: 2024-08-13

## 2024-08-13 RX ORDER — ALBUTEROL SULFATE 90 UG/1
AEROSOL, METERED RESPIRATORY (INHALATION)
Qty: 18 G | Refills: 0 | OUTPATIENT
Start: 2024-08-13

## 2024-08-14 DIAGNOSIS — J45.20 MILD INTERMITTENT ASTHMA, UNCOMPLICATED: ICD-10-CM

## 2024-08-14 RX ORDER — FLUTICASONE PROPIONATE AND SALMETEROL XINAFOATE 45; 21 UG/1; UG/1
2 AEROSOL, METERED RESPIRATORY (INHALATION) 2 TIMES DAILY
Qty: 1 EACH | Refills: 0 | Status: SHIPPED | OUTPATIENT
Start: 2024-08-14 | End: 2024-08-14 | Stop reason: CLARIF

## 2024-08-16 RX ORDER — FLUTICASONE PROPIONATE AND SALMETEROL XINAFOATE 45; 21 UG/1; UG/1
2 AEROSOL, METERED RESPIRATORY (INHALATION) 2 TIMES DAILY
Qty: 1 EACH | Refills: 0 | Status: SHIPPED | OUTPATIENT
Start: 2024-08-16 | End: 2024-08-16 | Stop reason: ALTCHOICE

## 2024-11-07 DIAGNOSIS — J45.20 MILD INTERMITTENT ASTHMA, UNCOMPLICATED: ICD-10-CM

## 2024-11-07 RX ORDER — ALBUTEROL SULFATE 90 UG/1
INHALANT RESPIRATORY (INHALATION)
Qty: 1 EACH | Refills: 0 | OUTPATIENT
Start: 2024-11-07

## 2024-12-02 ENCOUNTER — OFFICE VISIT (OUTPATIENT)
Age: 44
End: 2024-12-02
Payer: COMMERCIAL

## 2024-12-02 VITALS
BODY MASS INDEX: 33.32 KG/M2 | RESPIRATION RATE: 18 BRPM | HEART RATE: 86 BPM | SYSTOLIC BLOOD PRESSURE: 138 MMHG | WEIGHT: 238 LBS | OXYGEN SATURATION: 98 % | HEIGHT: 71 IN | DIASTOLIC BLOOD PRESSURE: 90 MMHG

## 2024-12-02 DIAGNOSIS — E78.00 ELEVATED LDL CHOLESTEROL LEVEL: ICD-10-CM

## 2024-12-02 DIAGNOSIS — R74.8 ELEVATED LIVER ENZYMES: ICD-10-CM

## 2024-12-02 DIAGNOSIS — Z12.11 COLON CANCER SCREENING: ICD-10-CM

## 2024-12-02 DIAGNOSIS — E55.9 VITAMIN D DEFICIENCY, UNSPECIFIED: ICD-10-CM

## 2024-12-02 DIAGNOSIS — J30.89 OTHER ALLERGIC RHINITIS: ICD-10-CM

## 2024-12-02 DIAGNOSIS — Z00.00 WELL ADULT EXAM: Primary | ICD-10-CM

## 2024-12-02 DIAGNOSIS — J45.20 MILD INTERMITTENT ASTHMA, UNCOMPLICATED: ICD-10-CM

## 2024-12-02 PROCEDURE — 99396 PREV VISIT EST AGE 40-64: CPT | Performed by: INTERNAL MEDICINE

## 2024-12-02 RX ORDER — ALBUTEROL SULFATE 90 UG/1
INHALANT RESPIRATORY (INHALATION)
Qty: 1 EACH | Refills: 0 | Status: SHIPPED | OUTPATIENT
Start: 2024-12-02 | End: 2024-12-02

## 2024-12-02 RX ORDER — ALBUTEROL SULFATE 90 UG/1
INHALANT RESPIRATORY (INHALATION)
Qty: 3 EACH | Refills: 1 | Status: SHIPPED | OUTPATIENT
Start: 2024-12-02

## 2024-12-02 RX ORDER — MONTELUKAST SODIUM 10 MG/1
10 TABLET ORAL DAILY
Qty: 90 TABLET | Refills: 1 | Status: SHIPPED | OUTPATIENT
Start: 2024-12-02

## 2024-12-02 SDOH — ECONOMIC STABILITY: FOOD INSECURITY: WITHIN THE PAST 12 MONTHS, YOU WORRIED THAT YOUR FOOD WOULD RUN OUT BEFORE YOU GOT MONEY TO BUY MORE.: NEVER TRUE

## 2024-12-02 SDOH — ECONOMIC STABILITY: INCOME INSECURITY: HOW HARD IS IT FOR YOU TO PAY FOR THE VERY BASICS LIKE FOOD, HOUSING, MEDICAL CARE, AND HEATING?: NOT HARD AT ALL

## 2024-12-02 SDOH — ECONOMIC STABILITY: FOOD INSECURITY: WITHIN THE PAST 12 MONTHS, THE FOOD YOU BOUGHT JUST DIDN'T LAST AND YOU DIDN'T HAVE MONEY TO GET MORE.: NEVER TRUE

## 2024-12-02 ASSESSMENT — PATIENT HEALTH QUESTIONNAIRE - PHQ9
SUM OF ALL RESPONSES TO PHQ9 QUESTIONS 1 & 2: 0
SUM OF ALL RESPONSES TO PHQ QUESTIONS 1-9: 0
2. FEELING DOWN, DEPRESSED OR HOPELESS: NOT AT ALL
SUM OF ALL RESPONSES TO PHQ QUESTIONS 1-9: 0
1. LITTLE INTEREST OR PLEASURE IN DOING THINGS: NOT AT ALL
SUM OF ALL RESPONSES TO PHQ QUESTIONS 1-9: 0
SUM OF ALL RESPONSES TO PHQ QUESTIONS 1-9: 0

## 2024-12-02 NOTE — PROGRESS NOTES
Chief Complaint   Patient presents with    Annual Exam     \"Have you been to the ER, urgent care clinic since your last visit?  Hospitalized since your last visit?\"    Two weeks ago  [facility]   Kidney stones    “Have you seen or consulted any other health care providers outside our system since your last visit?”    NO      “Have you had a colorectal cancer screening such as a colonoscopy/FIT/Cologuard?    NO    No colonoscopy on file  No cologuard on file  No FIT/FOBT on file   No flexible sigmoidoscopy on file            
guardian, if applicable) and other individuals in attendance with the patient were advised that Artificial Intelligence will be utilized during this visit to record and process the conversation to generate a clinical note. The patient (or guardian, if applicable) and other individuals in attendance at the appointment consented to the use of AI, including the recording.

## 2024-12-03 DIAGNOSIS — R74.8 ELEVATED LIVER ENZYMES: ICD-10-CM

## 2024-12-03 DIAGNOSIS — E55.9 VITAMIN D DEFICIENCY, UNSPECIFIED: Primary | ICD-10-CM

## 2024-12-03 DIAGNOSIS — R74.8 ELEVATED LIVER ENZYMES: Primary | ICD-10-CM

## 2024-12-03 LAB
25(OH)D3+25(OH)D2 SERPL-MCNC: 15.2 NG/ML (ref 30–100)
ALBUMIN SERPL-MCNC: 4.6 G/DL (ref 4.1–5.1)
ALP SERPL-CCNC: 105 IU/L (ref 44–121)
ALT SERPL-CCNC: 58 IU/L (ref 0–44)
AST SERPL-CCNC: 43 IU/L (ref 0–40)
BILIRUB SERPL-MCNC: 0.4 MG/DL (ref 0–1.2)
BUN SERPL-MCNC: 16 MG/DL (ref 6–24)
BUN/CREAT SERPL: 16 (ref 9–20)
CALCIUM SERPL-MCNC: 9.5 MG/DL (ref 8.7–10.2)
CHLORIDE SERPL-SCNC: 102 MMOL/L (ref 96–106)
CHOLEST SERPL-MCNC: 187 MG/DL (ref 100–199)
CO2 SERPL-SCNC: 21 MMOL/L (ref 20–29)
CREAT SERPL-MCNC: 0.98 MG/DL (ref 0.76–1.27)
EGFRCR SERPLBLD CKD-EPI 2021: 97 ML/MIN/1.73
GLOBULIN SER CALC-MCNC: 3.3 G/DL (ref 1.5–4.5)
GLUCOSE SERPL-MCNC: 84 MG/DL (ref 70–99)
HDLC SERPL-MCNC: 71 MG/DL
IMP & REVIEW OF LAB RESULTS: NORMAL
LDLC SERPL CALC-MCNC: 105 MG/DL (ref 0–99)
POTASSIUM SERPL-SCNC: 4.3 MMOL/L (ref 3.5–5.2)
PROT SERPL-MCNC: 7.9 G/DL (ref 6–8.5)
SODIUM SERPL-SCNC: 140 MMOL/L (ref 134–144)
TRIGL SERPL-MCNC: 56 MG/DL (ref 0–149)
VLDLC SERPL CALC-MCNC: 11 MG/DL (ref 5–40)

## 2024-12-03 RX ORDER — ERGOCALCIFEROL 1.25 MG/1
50000 CAPSULE, LIQUID FILLED ORAL WEEKLY
Qty: 12 CAPSULE | Refills: 1 | Status: SHIPPED | OUTPATIENT
Start: 2024-12-03

## 2024-12-04 ENCOUNTER — TELEPHONE (OUTPATIENT)
Age: 44
End: 2024-12-04

## 2024-12-04 NOTE — TELEPHONE ENCOUNTER
Lvm  Regarding labs          ----- Message from NALLELY Banuelos NP sent at 12/3/2024  4:17 PM EST -----  LDL mildly elevated. Please work on a lean diet and reduce saturated fats    Liver enzymes, please stop drinking alcohol and taking tylenol if doing so. Repeat AST and ALT in 4 weeks     Vitamin d low, send in 49075a weekly for 12 weeks. And then take oYC daily after 2000u

## 2024-12-31 LAB — NONINV COLON CA DNA+OCC BLD SCRN STL QL: NEGATIVE

## 2025-01-02 DIAGNOSIS — J45.20 MILD INTERMITTENT ASTHMA, UNCOMPLICATED: ICD-10-CM

## 2025-01-02 RX ORDER — ALBUTEROL SULFATE 90 UG/1
INHALANT RESPIRATORY (INHALATION)
Qty: 8.5 G | Refills: 0 | Status: SHIPPED | OUTPATIENT
Start: 2025-01-02

## 2025-01-02 RX ORDER — ALBUTEROL SULFATE 90 UG/1
INHALANT RESPIRATORY (INHALATION)
Qty: 6.7 EACH | Refills: 1 | OUTPATIENT
Start: 2025-01-02

## 2025-05-15 DIAGNOSIS — E55.9 VITAMIN D DEFICIENCY, UNSPECIFIED: ICD-10-CM

## 2025-05-16 RX ORDER — ERGOCALCIFEROL 1.25 MG/1
50000 CAPSULE, LIQUID FILLED ORAL WEEKLY
Qty: 12 CAPSULE | Refills: 1 | Status: SHIPPED | OUTPATIENT
Start: 2025-05-16

## 2025-06-04 DIAGNOSIS — J30.89 OTHER ALLERGIC RHINITIS: ICD-10-CM

## 2025-06-04 DIAGNOSIS — J45.20 MILD INTERMITTENT ASTHMA, UNCOMPLICATED: ICD-10-CM

## 2025-06-04 RX ORDER — MONTELUKAST SODIUM 10 MG/1
10 TABLET ORAL DAILY
Qty: 90 TABLET | Refills: 1 | Status: SHIPPED | OUTPATIENT
Start: 2025-06-04